# Patient Record
Sex: MALE | Race: WHITE | NOT HISPANIC OR LATINO | Employment: UNEMPLOYED | URBAN - METROPOLITAN AREA
[De-identification: names, ages, dates, MRNs, and addresses within clinical notes are randomized per-mention and may not be internally consistent; named-entity substitution may affect disease eponyms.]

---

## 2020-02-19 ENCOUNTER — CONSULT (OUTPATIENT)
Dept: SLEEP MEDICINE | Facility: HOSPITAL | Age: 50
End: 2020-02-19

## 2020-02-19 VITALS
OXYGEN SATURATION: 93 % | BODY MASS INDEX: 44.1 KG/M2 | HEART RATE: 55 BPM | WEIGHT: 315 LBS | SYSTOLIC BLOOD PRESSURE: 181 MMHG | DIASTOLIC BLOOD PRESSURE: 87 MMHG | HEIGHT: 71 IN

## 2020-02-19 DIAGNOSIS — G47.33 OBSTRUCTIVE SLEEP APNEA, ADULT: ICD-10-CM

## 2020-02-19 DIAGNOSIS — R06.83 SNORING: Primary | ICD-10-CM

## 2020-02-19 DIAGNOSIS — E66.01 MORBID OBESITY WITH BODY MASS INDEX (BMI) OF 50.0 TO 59.9 IN ADULT (HCC): ICD-10-CM

## 2020-02-19 PROCEDURE — 99203 OFFICE O/P NEW LOW 30 MIN: CPT | Performed by: INTERNAL MEDICINE

## 2020-02-19 RX ORDER — AMLODIPINE BESYLATE 5 MG/1
TABLET ORAL
COMMUNITY
Start: 2020-01-27 | End: 2020-12-07

## 2020-02-19 RX ORDER — CHLORTHALIDONE 25 MG/1
TABLET ORAL
COMMUNITY
Start: 2020-01-29 | End: 2022-10-19

## 2020-02-19 RX ORDER — METOPROLOL SUCCINATE 25 MG/1
25 TABLET, EXTENDED RELEASE ORAL DAILY
COMMUNITY
Start: 2020-02-15

## 2020-02-19 RX ORDER — BUPRENORPHINE HYDROCHLORIDE AND NALOXONE HYDROCHLORIDE DIHYDRATE 8; 2 MG/1; MG/1
1 TABLET SUBLINGUAL
COMMUNITY
Start: 2020-02-09

## 2020-02-19 RX ORDER — LISINOPRIL 40 MG/1
40 TABLET ORAL DAILY
COMMUNITY
End: 2022-07-08 | Stop reason: DRUGHIGH

## 2020-02-19 NOTE — PATIENT INSTRUCTIONS

## 2020-02-20 NOTE — PROGRESS NOTES
Subjective   Nic Carey is a 49 y.o. male is being seen for consultation today at the request of Tapan Gonzalez MD for the evaluation of snoring possible sleep disordered breathing.  His primary care physician is Dr. Marin.    History of Present Illness  Patient has been having some complaints of chest pain was seen for Cardiologic evaluation by Dr. Gonzalez.  He is referred for possible sleep disordered breathing.  The patient had snoring noted for at least 10 years.  He denies being told he had apneas.  He occasionally awakens gasping.  He says he sometimes is rested in the morning although occasionally not.  He has a headache 3 to 4 days/week on awakening.  He will fall asleep to sitting quietly during the day.  He denies problems while driving.He naps for about 45 minutes each day.    He has a history of loud snoring and snores in all positions he is awaken with a dry mouth and awaken coughing.  He has broken his nose in the past and says he did not have surgery but denies having problems breathing through his nose.  He has a history of reflux that he controls with diet.  He denies hypnagogue hallucinations sleep paralysis.  He does have a kicking of his legs at night that will occasionally keep him awake.  He has back leg and knee pain that bothers him at night.  He says his weight is fairly stable.    He goes to bed about 11 PM.  He will fall asleep in 20 minutes.  He wakens 3-5 times during the night.  He thinks he gets about 6 hours of sleep but feels tired.  He has a history of hypertension known for 4 years.  He denies any history of diabetes coronary artery disease.  Allergies   Allergen Reactions   • Ciprofloxacin Other (See Comments)     Impaired speech          Current Outpatient Medications:   •  amLODIPine (NORVASC) 5 MG tablet, , Disp: , Rfl:   •  aspirin 81 MG tablet, Take 81 mg by mouth Daily., Disp: , Rfl:   •  buprenorphine-naloxone (SUBOXONE) 8-2 MG per SL tablet, TAKE 2 TABLETS BY MOUTH  SUBLINGUALLY DAILY, Disp: , Rfl:   •  chlorthalidone (HYGROTON) 25 MG tablet, , Disp: , Rfl:   •  lisinopril (PRINIVIL,ZESTRIL) 40 MG tablet, Take 40 mg by mouth Daily., Disp: , Rfl:   •  metoprolol succinate XL (TOPROL-XL) 25 MG 24 hr tablet, , Disp: , Rfl:     Social History    Tobacco Use      Smoking status: Former Smoker he estimates smoking 2 packs/day for 35 years but quit 5 years ago.        Packs/day: 2.00        Types: Cigarettes        Quit date:         Years since quittin.1      Smokeless tobacco: Never Used       Social History     Substance and Sexual Activity   Alcohol Use Never   • Frequency: Never       Caffeine: He has 1 cup of coffee and 2 jagruti each day    Past Medical History:   Diagnosis Date   • Hypertension        Past Surgical History:   Procedure Laterality Date   • BACK SURGERY     • LAPAROSCOPIC CHOLECYSTECTOMY     He is also had oral surgery.    Family History   Problem Relation Age of Onset   • Hypertension Mother    • Sleep apnea Mother    • Diabetes Father    • Hypertension Father    • COPD Sister    • Stroke Brother    • Sleep apnea Brother    • Sleep apnea Brother    • Diabetes Brother        The following portions of the patient's history were reviewed and updated as appropriate: allergies, current medications, past family history, past medical history, past social history, past surgical history and problem list.    Review of Systems   Constitutional: Positive for unexpected weight change.   HENT: Positive for voice change.    Eyes: Positive for visual disturbance.   Respiratory: Positive for shortness of breath and wheezing.    Cardiovascular: Positive for leg swelling.   Gastrointestinal: Positive for blood in stool and constipation.        He complains of frequent heartburn   Endocrine: Positive for polydipsia.   Genitourinary: Negative.    Musculoskeletal: Positive for arthralgias, back pain, joint swelling and myalgias.   Skin: Negative.   "  Allergic/Immunologic: Negative.    Neurological: Positive for numbness and headaches.   Hematological: Negative.    Psychiatric/Behavioral: Positive for dysphoric mood. The patient is nervous/anxious.    [place] score is 14/24    Objective     BP (!) 181/87   Pulse 55   Ht 180.3 cm (71\")   Wt (!) 174 kg (383 lb 3.2 oz)   SpO2 93%   BMI 53.45 kg/m²      Physical Exam   Constitutional: He is oriented to person, place, and time. He appears well-developed and well-nourished.   He is morbidly obese.   HENT:   Head: Normocephalic and atraumatic.   He has Mallampati class II anatomy and is edentulous   Eyes: Pupils are equal, round, and reactive to light. EOM are normal.   Neck: Normal range of motion. Neck supple.   Cardiovascular: Normal rate, regular rhythm and normal heart sounds.   Pulmonary/Chest: Effort normal and breath sounds normal.   Abdominal: Soft. Bowel sounds are normal.   Musculoskeletal: Normal range of motion. He exhibits edema.   He has 3+ pedal edema   Neurological: He is alert and oriented to person, place, and time.   Skin: Skin is warm and dry.   Psychiatric: He has a normal mood and affect. His behavior is normal.         Assessment/Plan   Nic was seen today for sleeping problem.    Diagnoses and all orders for this visit:    Snoring  -     Home Sleep Study; Future    Obstructive sleep apnea, adult  -     Home Sleep Study; Future    Morbid obesity with body mass index (BMI) of 50.0 to 59.9 in adult (CMS/[facility])    Patient has a history of snoring and nonrestorative sleep.  I think he gives an excellent story for obstructive sleep apnea.  We will plan to proceed to home sleep testing.  We have discussed possible therapies including CPAP, weight control, oral appliance, and surgery.  We have also discussed the long-term consequences of untreated obstructive sleep apnea.  He is encouraged to lose weight.  He is encouraged to avoid alcohol and sedatives close to bedtime.  He is encouraged to " practice lateral position sleep.  He does have a history of occasional leg kicks.  If these do not improve with treatment of his obstructive sleep apnea they may need further evaluation.         Ren Rust MD [facility]  Sleep Medicine  Pulmonary and Critical Care Medicine

## 2020-03-03 ENCOUNTER — HOSPITAL ENCOUNTER (OUTPATIENT)
Dept: SLEEP MEDICINE | Facility: HOSPITAL | Age: 50
Discharge: HOME | End: 2020-03-03
Admitting: INTERNAL MEDICINE

## 2020-03-03 VITALS
OXYGEN SATURATION: 92 % | RESPIRATION RATE: 16 BRPM | BODY MASS INDEX: 44.1 KG/M2 | WEIGHT: 315 LBS | HEIGHT: 71 IN | DIASTOLIC BLOOD PRESSURE: 70 MMHG | SYSTOLIC BLOOD PRESSURE: 142 MMHG | HEART RATE: 71 BPM

## 2020-03-03 DIAGNOSIS — R06.83 SNORING: ICD-10-CM

## 2020-03-03 DIAGNOSIS — G47.33 OBSTRUCTIVE SLEEP APNEA, ADULT: ICD-10-CM

## 2020-03-03 PROCEDURE — 95806 SLEEP STUDY UNATT&RESP EFFT: CPT

## 2020-03-03 PROCEDURE — 95806 SLEEP STUDY UNATT&RESP EFFT: CPT | Performed by: INTERNAL MEDICINE

## 2020-03-05 DIAGNOSIS — G47.33 SEVERE OBSTRUCTIVE SLEEP APNEA: Primary | ICD-10-CM

## 2020-03-05 DIAGNOSIS — R06.83 SNORING: ICD-10-CM

## 2020-03-11 ENCOUNTER — OFFICE VISIT (OUTPATIENT)
Dept: SLEEP MEDICINE | Facility: HOSPITAL | Age: 50
End: 2020-03-11

## 2020-03-11 VITALS
WEIGHT: 315 LBS | DIASTOLIC BLOOD PRESSURE: 71 MMHG | BODY MASS INDEX: 44.1 KG/M2 | HEIGHT: 71 IN | OXYGEN SATURATION: 93 % | HEART RATE: 75 BPM | SYSTOLIC BLOOD PRESSURE: 171 MMHG

## 2020-03-11 DIAGNOSIS — E66.01 MORBID OBESITY WITH BODY MASS INDEX (BMI) OF 50.0 TO 59.9 IN ADULT (HCC): ICD-10-CM

## 2020-03-11 DIAGNOSIS — G47.33 SEVERE OBSTRUCTIVE SLEEP APNEA: Primary | ICD-10-CM

## 2020-03-11 PROCEDURE — 99214 OFFICE O/P EST MOD 30 MIN: CPT | Performed by: INTERNAL MEDICINE

## 2020-03-13 NOTE — PROGRESS NOTES
Subjective   Nic Carey is a 49 y.o. male is here today for follow-up.  He is seen for follow-up of snoring and nonrestorative sleep.  He is referred by Dr. Gonzalez.  His primary care physician is Dr. Marin.    History of Present Illness  Patient was last seen February 19.  He had a history of snoring restorative sleep.  He also with a history of hypertension morbid obesity.  He says he has been sleeping about the same.  He denies any change in health status.  He had home sleep testing on March 3 and thought his study night was slightly worse than usual.  He was irritated some by the device.  Past Medical History:   Diagnosis Date   • Hypertension        Past Surgical History:   Procedure Laterality Date   • BACK SURGERY  2005   • LAPAROSCOPIC CHOLECYSTECTOMY  2007           Current Outpatient Medications:   •  amLODIPine (NORVASC) 5 MG tablet, , Disp: , Rfl:   •  aspirin 81 MG tablet, Take 81 mg by mouth Daily., Disp: , Rfl:   •  buprenorphine-naloxone (SUBOXONE) 8-2 MG per SL tablet, TAKE 2 TABLETS BY MOUTH SUBLINGUALLY DAILY, Disp: , Rfl:   •  chlorthalidone (HYGROTON) 25 MG tablet, , Disp: , Rfl:   •  lisinopril (PRINIVIL,ZESTRIL) 40 MG tablet, Take 40 mg by mouth Daily., Disp: , Rfl:   •  metoprolol succinate XL (TOPROL-XL) 25 MG 24 hr tablet, , Disp: , Rfl:     Allergies   Allergen Reactions   • Ciprofloxacin Other (See Comments)     Impaired speech       The following portions of the patient's history were reviewed and updated as appropriate: allergies, current medications and problem list.    Review of Systems   Constitutional: Positive for unexpected weight change.   HENT: Positive for voice change.    Eyes: Positive for visual disturbance.   Respiratory: Positive for shortness of breath and wheezing.    Cardiovascular: Positive for leg swelling.   Gastrointestinal: Positive for blood in stool and constipation.   Endocrine: Positive for polydipsia.   Genitourinary: Negative.    Musculoskeletal: Positive  "for arthralgias, back pain, joint swelling and myalgias.   Skin: Negative.    Allergic/Immunologic: Negative.    Neurological: Positive for numbness.   Hematological: Negative.    Psychiatric/Behavioral: Positive for dysphoric mood. The patient is nervous/anxious.        Objective     /71   Pulse 75   Ht 180.3 cm (71\")   Wt (!) 176 kg (387 lb 3.2 oz)   SpO2 93%   BMI 54.00 kg/m²     Physical Exam   Constitutional: He is oriented to person, place, and time. He appears well-developed and well-nourished.   He is morbidly obese.   HENT:   Head: Normocephalic and atraumatic.   He has nasal airway narrowing and nasal septal deviation.  He has Mallampati class II anatomy.   Eyes: Pupils are equal, round, and reactive to light. EOM are normal.   Neck: Normal range of motion. Neck supple.   Cardiovascular: Normal rate, regular rhythm and normal heart sounds.   Pulmonary/Chest: Effort normal and breath sounds normal.   Abdominal: Soft. Bowel sounds are normal.   Musculoskeletal: Normal range of motion. He exhibits edema.   He has 2+ pedal edema   Neurological: He is alert and oriented to person, place, and time.   Skin: Skin is warm and dry.   Psychiatric: He has a normal mood and affect. His behavior is normal.   Home sleep testing on March 3 showed AHI.  This is consistent with severe obstructive sleep apnea.  He spent 199.7 minutes in the desaturated state or 45 minutes time in bed.      Assessment/Plan   Nic was seen today for follow-up.    Diagnoses and all orders for this visit:    Severe obstructive sleep apnea    Morbid obesity with body mass index (BMI) of 50.0 to 59.9 in adult (CMS/[facility])    Patient does have severe obstructive sleep apnea.  We discussed possible therapies including CPAP, weight control, oral appliance, and surgery.  He wishes to try CPAP.  We will place the order this.  We will plan to see him back in 2 months and we will download his machine to make certain ineffective.  If he will " tolerate the CPAP he will need overnight oximetry to be certain we are correcting nocturnal hypoxemia.  He is encouraged to lose weight.  He declines referral to the weight loss clinic.  He  Is encouraged avoid alcohol and sedatives close to bedtime.  He is encouraged ice lateral position sleep.           Ren Rust MD [facility]  Sleep Medicine  Pulmonary and Critical Care Medicine      03/13/20  11:49 AM

## 2020-05-19 ENCOUNTER — TELEMEDICINE (OUTPATIENT)
Dept: SLEEP MEDICINE | Facility: HOSPITAL | Age: 50
End: 2020-05-19

## 2020-05-19 VITALS — BODY MASS INDEX: 44.1 KG/M2 | HEIGHT: 71 IN | WEIGHT: 315 LBS

## 2020-05-19 DIAGNOSIS — G47.34 NOCTURNAL HYPOXEMIA: ICD-10-CM

## 2020-05-19 DIAGNOSIS — G47.33 OSA (OBSTRUCTIVE SLEEP APNEA): Primary | ICD-10-CM

## 2020-05-19 PROCEDURE — 99442 PR PHYS/QHP TELEPHONE EVALUATION 11-20 MIN: CPT | Performed by: NURSE PRACTITIONER

## 2020-05-19 NOTE — PROGRESS NOTES
Chief Complaint:   Chief Complaint   Patient presents with   • Follow-up       HPI:    Nic Carey is a 49 y.o. male here for follow-up of sleep apnea.  Patient was last seen 3/11/2020 and did decide to initiate CPAP therapy after sleep study 3/3/2020 showed severe obstructive sleep apnea as well as nocturnal hypoxemia.  Patient does not feel he is doing well with CPAP therapy.  Patient is sleeping 5 to 6 hours nightly and not refreshed upon awakening.  It does take patient around 30 minutes to go to sleep and he does get up in the night to use the restroom.  Patient has an [place] score of 13/24.  Patient states when the pressure increases on the mask this is when he becomes uncomfortable and takes the mask off.  I discussed with him today about his elevated AHI and that we will increase the pressure to start out at 12.  If patient does not do well with this we may have to switch him to BiPAP and patient is okay with this plan of care.        Current medications are:   Current Outpatient Medications:   •  amLODIPine (NORVASC) 5 MG tablet, , Disp: , Rfl:   •  aspirin 81 MG tablet, Take 81 mg by mouth Daily., Disp: , Rfl:   •  buprenorphine-naloxone (SUBOXONE) 8-2 MG per SL tablet, TAKE 2 TABLETS BY MOUTH SUBLINGUALLY DAILY, Disp: , Rfl:   •  chlorthalidone (HYGROTON) 25 MG tablet, , Disp: , Rfl:   •  lisinopril (PRINIVIL,ZESTRIL) 40 MG tablet, Take 40 mg by mouth Daily., Disp: , Rfl:   •  metoprolol succinate XL (TOPROL-XL) 25 MG 24 hr tablet, , Disp: , Rfl: .      The patient's relevant past medical, surgical, family and social history were reviewed and updated in Epic as appropriate.       Review of Systems   HENT: Positive for voice change.    Eyes: Positive for visual disturbance.   Respiratory: Positive for apnea, shortness of breath and wheezing.    Cardiovascular: Positive for leg swelling.   Gastrointestinal: Positive for blood in stool and constipation.   Endocrine: Positive for polydipsia.    Musculoskeletal: Positive for arthralgias, back pain, joint swelling and myalgias.   Neurological: Positive for numbness.   Psychiatric/Behavioral: Positive for dysphoric mood and sleep disturbance. The patient is nervous/anxious.    All other systems reviewed and are negative.        Objective:    Physical Exam   Constitutional: He is oriented to person, place, and time. He appears well-developed and well-nourished.   Morbidly obese male   HENT:   Head: Normocephalic and atraumatic.   Mallampati 2 anatomy   Neck: No tracheal deviation present.   Pulmonary/Chest: Effort normal.   Neurological: He is alert and oriented to person, place, and time.   Psychiatric: He has a normal mood and affect. His behavior is normal. Judgment and thought content normal.   41/62 days of use.  Greater than 4-hour use 38.7%.  90% pressure 12.0.  AHI of 27.6.  Settings 8-18.      ASSESSMENT/PLAN    Nic was seen today for follow-up.    Diagnoses and all orders for this visit:    EDGARD (obstructive sleep apnea)  -     PAP Therapy    Nocturnal hypoxemia            1. Counseled patient regarding multimodal approach with healthy nutrition, healthy sleep, regular physical activity, social activities, counseling, and medications. Encouraged to practice lateral sleep position. Avoid alcohol and sedatives close to bedtime.  2.   Refill supplies, settings have been adjusted to 12 to 20 cm H2O I will see patient back in 5 weeks to reassess possible switch to BiPAP next appointment if AHI is uncontrolled and patient cannot tolerate higher settings.  When AHI is under control we will order overnight oximetry while wearing CPAP to reassess nocturnal hypoxemia.  Unable to complete visit using a video connection to the patient. A phone visit was used to complete this visits. Total time of discussion was 15 minutes.    I have reviewed the results of my evaluation and impression and discussed my recommendations in detail with the patient.      Signed  by  Roseline Chester, APRN    May 19, 2020      CC: Gab Marin MD          No ref. provider found

## 2020-07-28 ENCOUNTER — OFFICE VISIT (OUTPATIENT)
Dept: SLEEP MEDICINE | Facility: HOSPITAL | Age: 50
End: 2020-07-28

## 2020-07-28 VITALS
SYSTOLIC BLOOD PRESSURE: 121 MMHG | HEIGHT: 71 IN | HEART RATE: 53 BPM | BODY MASS INDEX: 44.1 KG/M2 | WEIGHT: 315 LBS | DIASTOLIC BLOOD PRESSURE: 64 MMHG | OXYGEN SATURATION: 93 %

## 2020-07-28 DIAGNOSIS — G47.34 NOCTURNAL HYPOXEMIA: ICD-10-CM

## 2020-07-28 DIAGNOSIS — G47.33 OSA (OBSTRUCTIVE SLEEP APNEA): Primary | ICD-10-CM

## 2020-07-28 PROCEDURE — 99213 OFFICE O/P EST LOW 20 MIN: CPT | Performed by: NURSE PRACTITIONER

## 2020-07-28 RX ORDER — DOXAZOSIN 2 MG/1
2 TABLET ORAL NIGHTLY
COMMUNITY
End: 2022-07-08 | Stop reason: DRUGHIGH

## 2020-07-28 NOTE — PROGRESS NOTES
Chief Complaint:   Chief Complaint   Patient presents with   • Follow-up       HPI:    Nic Carey is a 49 y.o. male here for follow-up of sleep apnea.  Patient was last seen 5/19/2020 was not doing well with CPAP therapy.  Patient had an increased AHI at that time 27.6.  Looking at his 90% pressures we did increase his settings to 12-20.  We did spend some on last visit discussing BiPAP therapy and her need to possibly switch to this if we cannot get him under control.  Patient does feel at times that CPAP goes too high and he feels he is smothering and will cough his mask quickly.  Patient does want to feel better and at this time has an [place] scale of 6/20.  Patient is sleeping 6 to 7 hours night and does feel rested intermittently.  Patient will go to sleep within 15 to 30 minutes.    On 3/3/2020 testing also showed nocturnal hypoxemia, however, patient has not been able to be tested as of yet as his AHI has not been normal.  We will move forward passes soon as we have them stable.        Current medications are:   Current Outpatient Medications:   •  aspirin 81 MG tablet, Take 81 mg by mouth Daily., Disp: , Rfl:   •  buprenorphine-naloxone (SUBOXONE) 8-2 MG per SL tablet, TAKE 2 TABLETS BY MOUTH SUBLINGUALLY DAILY, Disp: , Rfl:   •  chlorthalidone (HYGROTON) 25 MG tablet, , Disp: , Rfl:   •  doxazosin (CARDURA) 2 MG tablet, Take 2 mg by mouth Every Night., Disp: , Rfl:   •  lisinopril (PRINIVIL,ZESTRIL) 40 MG tablet, Take 40 mg by mouth Daily., Disp: , Rfl:   •  metoprolol succinate XL (TOPROL-XL) 25 MG 24 hr tablet, , Disp: , Rfl:   •  amLODIPine (NORVASC) 5 MG tablet, , Disp: , Rfl: .      The patient's relevant past medical, surgical, family and social history were reviewed and updated in Epic as appropriate.       Review of Systems   HENT: Positive for voice change.    Eyes: Positive for visual disturbance.   Respiratory: Positive for apnea, shortness of breath and wheezing.    Cardiovascular:  Positive for leg swelling.   Gastrointestinal: Positive for constipation.   Endocrine: Positive for polydipsia.   Musculoskeletal: Positive for arthralgias, back pain, joint swelling and myalgias.   Neurological: Positive for numbness.   Psychiatric/Behavioral: Positive for dysphoric mood and sleep disturbance. The patient is nervous/anxious.    All other systems reviewed and are negative.        Objective:    Physical Exam   Constitutional: He is oriented to person, place, and time. He appears well-developed and well-nourished.   HENT:   Head: Normocephalic and atraumatic.   Mouth/Throat: Oropharynx is clear and moist.   Eyes: Conjunctivae are normal.   Neck: No tracheal deviation present.   Cardiovascular: Normal rate.   Pulmonary/Chest: Breath sounds normal.   Musculoskeletal: Normal range of motion.   Neurological: He is alert and oriented to person, place, and time.   Skin: Skin is warm and dry. No erythema. No pallor.   Psychiatric: He has a normal mood and affect. His behavior is normal. Judgment and thought content normal.   27/32 days.  Greater than 4-hour use 78.190% pressure 6 84-22.9 current settings 12 to 20 cm H2O.      ASSESSMENT/PLAN    Nic was seen today for follow-up.    Diagnoses and all orders for this visit:    EDGARD (obstructive sleep apnea)  -     CPAP Therapy    Nocturnal hypoxemia            1. Counseled patient regarding multimodal approach with healthy nutrition, healthy sleep, regular physical activity, social activities, counseling, and medications. Encouraged to practice lateral sleep position. Avoid alcohol and sedatives close to bedtime.  2.   Patient has inability to tolerate CPAP we will be switching to BiPAP maximum IPAP 25 minimum EPAP maximum pressure support 8 minimum pressure support 4.  I will see patient back in 5 weeks to reassess.  Patient's AHI is controlled on next appointment we will have an overnight oximetry done to reassess his hypoxemia.  I have reviewed the  results of my evaluation and impression and discussed my recommendations in detail with the patient.      Signed by  Roseline Chester, APRN    July 28, 2020      CC: Gab Marin MD          No ref. provider found

## 2020-12-07 ENCOUNTER — TELEMEDICINE (OUTPATIENT)
Dept: SLEEP MEDICINE | Facility: HOSPITAL | Age: 50
End: 2020-12-07

## 2020-12-07 DIAGNOSIS — G47.33 OSA (OBSTRUCTIVE SLEEP APNEA): Primary | ICD-10-CM

## 2020-12-07 PROCEDURE — 99213 OFFICE O/P EST LOW 20 MIN: CPT | Performed by: NURSE PRACTITIONER

## 2020-12-07 NOTE — PROGRESS NOTES
Chief Complaint:   Chief Complaint   Patient presents with   • Follow-up       HPI:    Nic Carey is a 50 y.o. male here for follow-up of sleep apnea.  Patient was last seen 7/28/2020.  Patient does have sleep apnea and nocturnal hypoxemia.  Patient was unable to tolerate CPAP and on last visit he did wish to switch to BiPAP.  Patient feels he is doing better on BiPAP therapy however we have discussed today his AHI is still elevated at 31.3.  We will be making some changes today to try and decrease the AHI to 5 or less.  Patient states he will do fine with this.  He is sleeping 6 to 7 hours nightly and does feel rested upon awakening.  Patient will get up x1 in the night to use the bathroom.  Patient will go to sleep within 15 to 20 minutes.  Patient does understand we are increasing pressures and that he will follow-up.        Current medications are:   Current Outpatient Medications:   •  buprenorphine-naloxone (SUBOXONE) 8-2 MG per SL tablet, TAKE 2 TABLETS BY MOUTH SUBLINGUALLY DAILY, Disp: , Rfl:   •  chlorthalidone (HYGROTON) 25 MG tablet, , Disp: , Rfl:   •  doxazosin (CARDURA) 2 MG tablet, Take 2 mg by mouth Every Night., Disp: , Rfl:   •  lisinopril (PRINIVIL,ZESTRIL) 40 MG tablet, Take 40 mg by mouth Daily., Disp: , Rfl:   •  metoprolol succinate XL (TOPROL-XL) 25 MG 24 hr tablet, , Disp: , Rfl:   •  aspirin 81 MG tablet, Take 81 mg by mouth Daily., Disp: , Rfl: .      The patient's relevant past medical, surgical, family and social history were reviewed and updated in Epic as appropriate.       Review of Systems   HENT: Positive for voice change.    Eyes: Positive for visual disturbance.   Respiratory: Positive for apnea, shortness of breath and wheezing.    Cardiovascular: Positive for leg swelling.   Gastrointestinal: Positive for constipation.   Endocrine: Positive for polydipsia.   Musculoskeletal: Positive for arthralgias, back pain and myalgias.   Neurological: Positive for numbness.    Psychiatric/Behavioral: Positive for dysphoric mood and sleep disturbance. The patient is nervous/anxious.          Objective:    Physical Exam  Pulmonary:      Effort: Pulmonary effort is normal. No respiratory distress.   Neurological:      Mental Status: He is alert.   Psychiatric:         Mood and Affect: Mood normal.         Behavior: Behavior normal.         Thought Content: Thought content normal.         Judgment: Judgment normal.       24/30 days of use  Greater than 4-hour use 63.3  90% IPAP 15.5  90% EPAP 11  AHI 31.3    ASSESSMENT/PLAN    Diagnoses and all orders for this visit:    1. EDGARD (obstructive sleep apnea) (Primary)  -     Generic Auto PAP Order            1. Counseled patient regarding multimodal approach with healthy nutrition, healthy sleep, regular physical activity, social activities, counseling, and medications. Encouraged to practice lateral sleep position. Avoid alcohol and sedatives close to bedtime.  2.   Unable to complete visit using a video connection to the patient. A phone visit was used to complete this visits. Total time of discussion was 15 minutes.  Increase minimum EPAP to 12 cm back in 4 weeks.  I have reviewed the results of my evaluation and impression and discussed my recommendations in detail with the patient.      Signed by  ANISA Hoff    December 7, 2020      CC: Gab Marin MD          No ref. provider found

## 2022-05-12 ENCOUNTER — LAB (OUTPATIENT)
Dept: LAB | Facility: HOSPITAL | Age: 52
End: 2022-05-12

## 2022-05-12 ENCOUNTER — TELEPHONE (OUTPATIENT)
Dept: NEUROLOGY | Facility: CLINIC | Age: 52
End: 2022-05-12

## 2022-05-12 ENCOUNTER — OFFICE VISIT (OUTPATIENT)
Dept: NEUROLOGY | Facility: CLINIC | Age: 52
End: 2022-05-12

## 2022-05-12 VITALS
DIASTOLIC BLOOD PRESSURE: 66 MMHG | HEART RATE: 65 BPM | HEIGHT: 70 IN | WEIGHT: 315 LBS | OXYGEN SATURATION: 96 % | TEMPERATURE: 97.3 F | SYSTOLIC BLOOD PRESSURE: 136 MMHG | BODY MASS INDEX: 45.1 KG/M2

## 2022-05-12 DIAGNOSIS — M79.10 MYALGIA: ICD-10-CM

## 2022-05-12 DIAGNOSIS — E66.01 MORBID OBESITY WITH BODY MASS INDEX (BMI) OF 50.0 TO 59.9 IN ADULT: ICD-10-CM

## 2022-05-12 DIAGNOSIS — G60.9 IDIOPATHIC PERIPHERAL NEUROPATHY: Primary | ICD-10-CM

## 2022-05-12 DIAGNOSIS — G47.33 SEVERE OBSTRUCTIVE SLEEP APNEA: ICD-10-CM

## 2022-05-12 DIAGNOSIS — R20.2 PARESTHESIA OF BOTH HANDS: ICD-10-CM

## 2022-05-12 PROBLEM — I10 HYPERTENSION: Status: ACTIVE | Noted: 2022-05-12

## 2022-05-12 PROBLEM — M54.50 CHRONIC LOW BACK PAIN: Status: ACTIVE | Noted: 2022-05-12

## 2022-05-12 PROBLEM — G89.29 CHRONIC LOW BACK PAIN: Status: ACTIVE | Noted: 2022-05-12

## 2022-05-12 PROBLEM — E03.9 ACQUIRED HYPOTHYROIDISM: Status: ACTIVE | Noted: 2022-05-12

## 2022-05-12 PROBLEM — F41.9 ANXIETY: Status: ACTIVE | Noted: 2022-05-12

## 2022-05-12 PROBLEM — R73.03 PRE-DIABETES: Status: ACTIVE | Noted: 2022-05-12

## 2022-05-12 PROBLEM — N18.2 STAGE 2 CHRONIC KIDNEY DISEASE: Status: ACTIVE | Noted: 2022-05-12

## 2022-05-12 PROBLEM — F79 MENTALLY DISABLED: Status: ACTIVE | Noted: 2022-05-12

## 2022-05-12 LAB
ALBUMIN SERPL-MCNC: 3.8 G/DL (ref 3.5–5.2)
ALBUMIN/GLOB SERPL: 1.1 G/DL
ALP SERPL-CCNC: 64 U/L (ref 39–117)
ALT SERPL W P-5'-P-CCNC: 18 U/L (ref 1–41)
ANION GAP SERPL CALCULATED.3IONS-SCNC: 12.1 MMOL/L (ref 5–15)
AST SERPL-CCNC: 24 U/L (ref 1–40)
BASOPHILS # BLD AUTO: 0.03 10*3/MM3 (ref 0–0.2)
BASOPHILS NFR BLD AUTO: 0.7 % (ref 0–1.5)
BILIRUB SERPL-MCNC: 0.5 MG/DL (ref 0–1.2)
BUN SERPL-MCNC: 21 MG/DL (ref 6–20)
BUN/CREAT SERPL: 24.1 (ref 7–25)
CALCIUM SPEC-SCNC: 9.7 MG/DL (ref 8.6–10.5)
CHLORIDE SERPL-SCNC: 96 MMOL/L (ref 98–107)
CHROMATIN AB SERPL-ACNC: 11.2 IU/ML (ref 0–14)
CK SERPL-CCNC: 236 U/L (ref 20–200)
CO2 SERPL-SCNC: 28.9 MMOL/L (ref 22–29)
CREAT SERPL-MCNC: 0.87 MG/DL (ref 0.76–1.27)
CRP SERPL-MCNC: 4.06 MG/DL (ref 0–0.5)
DEPRECATED RDW RBC AUTO: 41.7 FL (ref 37–54)
EGFRCR SERPLBLD CKD-EPI 2021: 103.8 ML/MIN/1.73
EOSINOPHIL # BLD AUTO: 0.16 10*3/MM3 (ref 0–0.4)
EOSINOPHIL NFR BLD AUTO: 3.9 % (ref 0.3–6.2)
ERYTHROCYTE [DISTWIDTH] IN BLOOD BY AUTOMATED COUNT: 13 % (ref 12.3–15.4)
FOLATE SERPL-MCNC: 8.32 NG/ML (ref 4.78–24.2)
GLOBULIN UR ELPH-MCNC: 3.4 GM/DL
GLUCOSE SERPL-MCNC: 117 MG/DL (ref 65–99)
HBA1C MFR BLD: 5.5 % (ref 4.8–5.6)
HCT VFR BLD AUTO: 34.3 % (ref 37.5–51)
HGB BLD-MCNC: 11.6 G/DL (ref 13–17.7)
IMM GRANULOCYTES # BLD AUTO: 0.01 10*3/MM3 (ref 0–0.05)
IMM GRANULOCYTES NFR BLD AUTO: 0.2 % (ref 0–0.5)
LYMPHOCYTES # BLD AUTO: 0.97 10*3/MM3 (ref 0.7–3.1)
LYMPHOCYTES NFR BLD AUTO: 23.4 % (ref 19.6–45.3)
MCH RBC QN AUTO: 30.1 PG (ref 26.6–33)
MCHC RBC AUTO-ENTMCNC: 33.8 G/DL (ref 31.5–35.7)
MCV RBC AUTO: 88.9 FL (ref 79–97)
MONOCYTES # BLD AUTO: 0.79 10*3/MM3 (ref 0.1–0.9)
MONOCYTES NFR BLD AUTO: 19 % (ref 5–12)
NEUTROPHILS NFR BLD AUTO: 2.19 10*3/MM3 (ref 1.7–7)
NEUTROPHILS NFR BLD AUTO: 52.8 % (ref 42.7–76)
NRBC BLD AUTO-RTO: 0 /100 WBC (ref 0–0.2)
PLATELET # BLD AUTO: 181 10*3/MM3 (ref 140–450)
PMV BLD AUTO: 10.4 FL (ref 6–12)
POTASSIUM SERPL-SCNC: 4.1 MMOL/L (ref 3.5–5.2)
PROT SERPL-MCNC: 7.2 G/DL (ref 6–8.5)
RBC # BLD AUTO: 3.86 10*6/MM3 (ref 4.14–5.8)
SODIUM SERPL-SCNC: 137 MMOL/L (ref 136–145)
TSH SERPL DL<=0.05 MIU/L-ACNC: 2.5 UIU/ML (ref 0.27–4.2)
VIT B12 BLD-MCNC: 322 PG/ML (ref 211–946)
WBC NRBC COR # BLD: 4.15 10*3/MM3 (ref 3.4–10.8)

## 2022-05-12 PROCEDURE — 82607 VITAMIN B-12: CPT

## 2022-05-12 PROCEDURE — 86431 RHEUMATOID FACTOR QUANT: CPT

## 2022-05-12 PROCEDURE — 83036 HEMOGLOBIN GLYCOSYLATED A1C: CPT

## 2022-05-12 PROCEDURE — 83825 ASSAY OF MERCURY: CPT

## 2022-05-12 PROCEDURE — 83655 ASSAY OF LEAD: CPT

## 2022-05-12 PROCEDURE — 86038 ANTINUCLEAR ANTIBODIES: CPT

## 2022-05-12 PROCEDURE — 82550 ASSAY OF CK (CPK): CPT

## 2022-05-12 PROCEDURE — 82300 ASSAY OF CADMIUM: CPT

## 2022-05-12 PROCEDURE — 82570 ASSAY OF URINE CREATININE: CPT

## 2022-05-12 PROCEDURE — 86617 LYME DISEASE ANTIBODY: CPT

## 2022-05-12 PROCEDURE — 82746 ASSAY OF FOLIC ACID SERUM: CPT

## 2022-05-12 PROCEDURE — 36415 COLL VENOUS BLD VENIPUNCTURE: CPT

## 2022-05-12 PROCEDURE — 84155 ASSAY OF PROTEIN SERUM: CPT

## 2022-05-12 PROCEDURE — 86200 CCP ANTIBODY: CPT

## 2022-05-12 PROCEDURE — 82784 ASSAY IGA/IGD/IGG/IGM EACH: CPT

## 2022-05-12 PROCEDURE — 82595 ASSAY OF CRYOGLOBULIN: CPT

## 2022-05-12 PROCEDURE — 82175 ASSAY OF ARSENIC: CPT

## 2022-05-12 PROCEDURE — 86334 IMMUNOFIX E-PHORESIS SERUM: CPT

## 2022-05-12 PROCEDURE — 84165 PROTEIN E-PHORESIS SERUM: CPT

## 2022-05-12 PROCEDURE — 86140 C-REACTIVE PROTEIN: CPT

## 2022-05-12 PROCEDURE — 99214 OFFICE O/P EST MOD 30 MIN: CPT | Performed by: NURSE PRACTITIONER

## 2022-05-12 PROCEDURE — 80050 GENERAL HEALTH PANEL: CPT

## 2022-05-12 RX ORDER — SERTRALINE HYDROCHLORIDE 100 MG/1
100 TABLET, FILM COATED ORAL DAILY
COMMUNITY
Start: 2022-05-03

## 2022-05-12 RX ORDER — LEVOTHYROXINE SODIUM 0.03 MG/1
25 TABLET ORAL DAILY
COMMUNITY
Start: 2022-03-02

## 2022-05-12 RX ORDER — GABAPENTIN 300 MG/1
300 CAPSULE ORAL 3 TIMES DAILY
COMMUNITY
Start: 2022-04-06

## 2022-05-12 NOTE — TELEPHONE ENCOUNTER
I ATTEMPTED TO CONTACT [employer] AND TRIED MULTIPLE EXTENSIONS. UNABLE TO REACH ANYONE DIRECTLY OR LVM.    DOCUMENTING PER [employer].

## 2022-05-12 NOTE — TELEPHONE ENCOUNTER
Caller: Nic Carey    Relationship: Self    Best call back number: [phone]    What was the call regarding: PT CALLED STATING HE CONTACTED [employer] TO REQUEST EMG/NCV RESULTS BE FAXED TO OUR OFFICE. PT STATES THAT [employer] REQUESTED THAT OUR OFFICE CONTACT THEM DIRECTLY TO REQUEST EMG/NCV.    Do you require a callback: IF NEEDED.    DOCUMENTING PER [employer].

## 2022-05-12 NOTE — TELEPHONE ENCOUNTER
Called patient and spoke with him about where to get his records.  Faxed a request for EMG/NCV results.

## 2022-05-12 NOTE — PROGRESS NOTES
Neuro Office Visit      Encounter Date: 2022   Patient Name: Nic Carey  : 1970   MRN: [id number]   PCP: Dr Marin  Referring: Tolu Barrios  Chief Complaint:    Chief Complaint   Patient presents with   • Peripheral Neuropathy       History of Present Illness: Nic Carey is a 51 y.o. male who is here today in Neurology for neuropathy    Neuropathy  1 year ago developed pain in bilat feet, pins and needles. Sx up to ankle. Sx are intermittent. Sleeps with his shoes on. Treated with GBP which helped the pain but not the numbness.  He thinks it is from his back. It is positional. No falls.  EMG of bilat lower ext -showed neuropathy.(Data deficient)    2 months developed numbness, tingling and burning of bilat hands. Left >R. Ring finger and pinky are more involved. Has not had EMG of UE.Skin feels dry. No weakness or dropping items.  No neck pain.    Complains of myalgia if his sits with feet dangling.    Taking  tid reduces pain.    Has pre-diabetes. Last A1c around 7.    No etoh. Not skipping meals.    Has sleep apnea. On bipap. Non-compliant. Needs follow up.      Taking Subsolv    PMH: history of opiate and meth abuse. No etoh, former smoker, chronic low back pain, myalgia, anxiety, htn, hypothyroidism  SH: discectomy ellyn  Subjective      Past Medical History:   Past Medical History:   Diagnosis Date   • Acquired hypothyroidism    • Anxiety    • Chronic low back pain    • Hypertension    • Mentally disabled    • Pre-diabetes    • Stage 2 chronic kidney disease        Past Surgical History:   Past Surgical History:   Procedure Laterality Date   • BACK SURGERY     • LAPAROSCOPIC CHOLECYSTECTOMY         Family History:   Family History   Problem Relation Age of Onset   • Hypertension Mother    • Sleep apnea Mother    • Diabetes Father    • Hypertension Father    • COPD Sister    • Stroke Brother    • Sleep apnea Brother    • Sleep apnea Brother    • Diabetes Brother         Social History:   Social History     Socioeconomic History   • Marital status:    Tobacco Use   • Smoking status: Former Smoker     Packs/day: 2.00     Types: Cigarettes     Quit date:      Years since quittin.3   • Smokeless tobacco: Never Used   Substance and Sexual Activity   • Alcohol use: Never   • Drug use: Not Currently     Types: Marijuana     Comment: pain pills quit 2 yrs ago       Medications:     Current Outpatient Medications:   •  aspirin 81 MG tablet, Take 81 mg by mouth Daily., Disp: , Rfl:   •  buprenorphine-naloxone (SUBOXONE) 8-2 MG per SL tablet, TAKE 2 TABLETS BY MOUTH SUBLINGUALLY DAILY, Disp: , Rfl:   •  chlorthalidone (HYGROTON) 25 MG tablet, , Disp: , Rfl:   •  doxazosin (CARDURA) 2 MG tablet, Take 2 mg by mouth Every Night., Disp: , Rfl:   •  lisinopril (PRINIVIL,ZESTRIL) 40 MG tablet, Take 40 mg by mouth Daily., Disp: , Rfl:   •  metoprolol succinate XL (TOPROL-XL) 25 MG 24 hr tablet, , Disp: , Rfl:   •  gabapentin (NEURONTIN) 300 MG capsule, Take 300 mg by mouth 3 (Three) Times a Day., Disp: , Rfl:   •  levothyroxine (SYNTHROID, LEVOTHROID) 25 MCG tablet, Take 25 mcg by mouth Daily., Disp: , Rfl:   •  ProAir  (90 Base) MCG/ACT inhaler, 2 puffs Every 4 (Four) Hours As Needed., Disp: , Rfl:   •  sertraline (ZOLOFT) 100 MG tablet, , Disp: , Rfl:     Allergies:   Allergies   Allergen Reactions   • Ciprofloxacin Other (See Comments)     Impaired speech       PHQ-9 Total Score:     STEADI Fall Risk Assessment has not been completed.    Objective     Physical Exam:   Physical Exam  Neurological:      Mental Status: He is oriented to person, place, and time.      Coordination: Finger-Nose-Finger Test, Heel to Shin Test and Romberg Test normal.      Gait: Gait is intact.      Deep Tendon Reflexes:      Reflex Scores:       Tricep reflexes are 1+ on the right side and 1+ on the left side.       Bicep reflexes are 1+ on the right side and 1+ on the left side.        Brachioradialis reflexes are 1+ on the right side and 1+ on the left side.       Patellar reflexes are 1+ on the right side and 1+ on the left side.       Achilles reflexes are 1+ on the right side and 1+ on the left side.  Psychiatric:         Speech: Speech normal.         Neurologic Exam     Mental Status   Oriented to person, place, and time.   Follows 3 step commands.   Attention: normal. Concentration: normal.   Speech: speech is normal   Level of consciousness: alert  Knowledge: consistent with education.   Normal comprehension.     Cranial Nerves     CN III, IV, VI   Right pupil: Accommodation: intact.   Left pupil: Accommodation: intact.   CN III: no CN III palsy  CN VI: no CN VI palsy  Nystagmus: none   Diplopia: none  Upgaze: normal  Downgaze: normal  Conjugate gaze: present    CN VII   Facial expression full, symmetric.     CN VIII   Hearing: intact    Motor Exam   Muscle bulk: normal  Overall muscle tone: normal    Strength   Right neck flexion: 5/5  Left neck flexion: 5/5  Right neck extension: 5/5  Left neck extension: 5/5  Right deltoid: 5/5  Left deltoid: 5/5  Right biceps: 5/5  Left biceps: 5/5  Right triceps: 5/5  Left triceps: 5/5  Right wrist flexion: 5/5  Left wrist flexion: 5/5  Right wrist extension: 5/5  Left wrist extension: 5/5  Right interossei: 5/5  Left interossei: 5/5  Right abdominals: 5/5  Left abdominals: 5/5  Right iliopsoas: 5/5  Left iliopsoas: 5/5  Right quadriceps: 5/5  Left quadriceps: 5/5  Right hamstrin/5  Left hamstrin/5  Right glutei: 5/5  Left glutei: 5/5  Right anterior tibial: 5/5  Left anterior tibial: 5/5  Right posterior tibial: 5/5  Left posterior tibial: 5/5  Right peroneal: 5/5  Left peroneal: 5/5  Right gastroc: 5/5  Left gastroc: 5/5    Sensory Exam   Right arm light touch: decreased from elbow  Left arm light touch: normal  Right leg light touch: decreased from toes  Left leg light touch: decreased from toes    Gait, Coordination, and Reflexes  "    Gait  Gait: normal    Coordination   Romberg: negative  Finger to nose coordination: normal  Heel to shin coordination: normal    Tremor   Resting tremor: absent  Action tremor: absent    Reflexes   Right brachioradialis: 1+  Left brachioradialis: 1+  Right biceps: 1+  Left biceps: 1+  Right triceps: 1+  Left triceps: 1+  Right patellar: 1+  Left patellar: 1+  Right achilles: 1+  Left achilles: 1+  Right : 1+  Left : 1+Slow, steady with ext rotation of ankles.        Vital Signs:   Vitals:    05/12/22 0916   BP: 136/66   Pulse: 65   Temp: 97.3 °F (36.3 °C)   SpO2: 96%   Weight: (!) 161 kg (356 lb)   Height: 177.8 cm (70\")     Body mass index is 51.08 kg/m².         Assessment / Plan      Assessment/Plan:   Diagnoses and all orders for this visit:    1. Idiopathic peripheral neuropathy (Primary)  Comments:  Cont [facility]  Orders:  -     EMG & Nerve Conduction Test; Future    2. Paresthesia of both hands  -     CBC & Differential; Future  -     CK; Future  -     Comprehensive Metabolic Panel; Future  -     C-reactive Protein; Future  -     Cryoglobulin; Future  -     Hemoglobin A1c; Future  -     JOYCE + PE; Future  -     Lyme Disease, Line Blot; Future  -     Heavy Metals Profile II, Urine - Urine, Clean Catch; Future  -     Rheumatoid Arthritis (RA) Profile; Future  -     TSH; Future  -     Vitamin B12 & Folate; Future  -     YOLANDA by IFA, Reflex 9-biomarkers profile; Future  -     Rheumatoid Factor; Future  -     EMG & Nerve Conduction Test; Future    3. Myalgia    4. Severe obstructive sleep apnea  Comments:   sleep med    5. Morbid obesity with body mass index (BMI) of 50.0 to 59.9 in adult (HCC)     Encourage weight loss      Patient Education:       Reviewed medications, potential side effects and signs and symptoms to report. Discussed risk versus benefits of treatment plan with patient and/or family-including medications, labs and radiology that may be ordered. Addressed questions and concerns during " visit. Patient and/or family verbalized understanding and agree with plan. Instructed to call the office with any questions and report to ER with any life-threatening symptoms.     Follow Up:   Return in about 6 weeks (around 6/23/2022) for Recheck.    During this visit the following were done:  Labs Reviewed []    Labs Ordered []    Radiology Reports Reviewed []    Radiology Ordered []    PCP Records Reviewed [x]    Referring Provider Records Reviewed []    ER Records Reviewed []    Hospital Records Reviewed []    History Obtained From Family []    Radiology Images Reviewed []    Other Reviewed []    Records Requested []      Narciso Yadav, DNP, APRN

## 2022-05-13 LAB
ALBUMIN SERPL ELPH-MCNC: 3.6 G/DL (ref 2.9–4.4)
ALBUMIN/GLOB SERPL: 1.2 {RATIO} (ref 0.7–1.7)
ALPHA1 GLOB SERPL ELPH-MCNC: 0.2 G/DL (ref 0–0.4)
ALPHA2 GLOB SERPL ELPH-MCNC: 0.6 G/DL (ref 0.4–1)
B-GLOBULIN SERPL ELPH-MCNC: 1 G/DL (ref 0.7–1.3)
GAMMA GLOB SERPL ELPH-MCNC: 1.3 G/DL (ref 0.4–1.8)
GLOBULIN SER-MCNC: 3.2 G/DL (ref 2.2–3.9)
IGA SERPL-MCNC: 352 MG/DL (ref 90–386)
IGG SERPL-MCNC: 1319 MG/DL (ref 603–1613)
IGM SERPL-MCNC: 138 MG/DL (ref 20–172)
INTERPRETATION SERPL IEP-IMP: NORMAL
LABORATORY COMMENT REPORT: NORMAL
M PROTEIN SERPL ELPH-MCNC: NORMAL G/DL
PROT SERPL-MCNC: 6.8 G/DL (ref 6–8.5)

## 2022-05-14 LAB
ANA SER QL IF: NEGATIVE
CCP IGA+IGG SERPL IA-ACNC: 4 UNITS (ref 0–19)
LABORATORY COMMENT REPORT: NORMAL
RHEUMATOID FACT SERPL-ACNC: 11.8 IU/ML

## 2022-05-16 LAB
ARSENIC UR-MCNC: NORMAL UG/L (ref 0–9)
CADMIUM 24H UR-MCNC: NORMAL UG/L
CREAT UR-MCNC: 1.37 G/L (ref 0.3–3)
CRYOGLOB SER QL 1D COLD INC: NORMAL
LEAD 24H UR-MCNC: NORMAL UG/L (ref 0–49)
MERCURY 24H UR-MCNC: NORMAL UG/L (ref 0–19)

## 2022-05-17 ENCOUNTER — TELEPHONE (OUTPATIENT)
Dept: NEUROLOGY | Facility: CLINIC | Age: 52
End: 2022-05-17

## 2022-05-17 NOTE — TELEPHONE ENCOUNTER
Called patient and gave results.  Patient will start taking a multivitamin.    ----- Message from Narciso Yadav DNP, APRN sent at 5/17/2022  2:51 PM EDT -----  Please notify pt his tests for rheumatoid arthritis, autoimmune diseases of connective tissues, vit b12, folate, thyroid, heavy metal poisoning, immune labs, diabetes labs were normal. He has mild anemia, inflammation and muscle breakdown. These are mild. He can take a multiple vitamin.

## 2022-05-19 ENCOUNTER — PROCEDURE VISIT (OUTPATIENT)
Dept: NEUROLOGY | Facility: CLINIC | Age: 52
End: 2022-05-19

## 2022-05-19 DIAGNOSIS — G60.3 IDIOPATHIC PROGRESSIVE NEUROPATHY: Primary | ICD-10-CM

## 2022-05-19 LAB

## 2022-05-19 PROCEDURE — 95886 MUSC TEST DONE W/N TEST COMP: CPT | Performed by: PSYCHIATRY & NEUROLOGY

## 2022-05-19 PROCEDURE — 95911 NRV CNDJ TEST 9-10 STUDIES: CPT | Performed by: PSYCHIATRY & NEUROLOGY

## 2022-05-19 NOTE — PROGRESS NOTES
[facility]   Electrodiagnostic Laboratory    Nerve Conduction & EMG Report        Patient:  Nic Carey   Patient ID: [id number]   YOB: 1970  Sex:  male      Exam Physician:  Shivam Lopez MD  Refer Physician:  Narciso LANGE    Electromyogram and Nerve Conduction Velocity Procedure Note    Hx: 51 y.o. right handed male with complaint of numbness involving the the upper and lower extremities. Symptoms have been present for several years and were provoked by no clear event. Significant past medical history includes diabetes mellitus.  Family history no family history of nerve or muscle disease.    Exam: Motor power is normal. There is no atrophy. There are no fasciculations. Deep tendon reflexes are hypoactive. Sensory exam is abnormal distal symmetrical loss of pin and light touch in the B LE.     Edx studies of the L UE and LE were performed to evaluate for peripheral neuropathy.      NCS Examination   For sensory nerve conduction studies, the amplitude is measured peak-to-peak, the latency reported is the distal peak latency, and the conduction velocity, if measured, is determined from onset latencies and is over the forearm.   For motor nerve conduction studies, the amplitude is measured baseline-to-peak, the latency reported is the distal onset latency, the conduction velocity is calculated over the forearm, and the F wave latency is the minimum latency.   Unless otherwise noted, the hand temperature was monitored continuously and remained between 32°C and 36°C during the performance of the NCSs.        Sensory NCS      Nerve / Sites Rec. Site Onset Lat Peak Lat NP Amp PP Amp Segments Distance Velocity     ms ms µV µV  mm m/s   L Median - Digit II (Antidromic)      Wrist Dig II NR NR NR NR Wrist - Dig  NR   L Ulnar - Digit V (Antidromic)      Wrist Dig V 2.81 3.54 0.00 10.9 Wrist - Dig V 110 39   L Radial - Anatomical snuff box (Forearm)      Forearm Wrist  1.51 2.14 7.0 7.4 Forearm - Wrist 100 66   L Sural - Ankle (Calf)      Calf Ankle NR NR NR NR Calf - Ankle 140 NR                 Motor NCS      Nerve / Sites Muscle Latency Amplitude Amp % Duration Segments Distance Lat Diff Velocity     ms mV % ms  mm ms m/s   L Median - APB      Wrist APB 11.15 3.5 100  Wrist - APB 70        Elbow APB 18.59 0.6 17.1 8.65 Elbow - Wrist 260 7.45 35   L Ulnar - ADM      Wrist ADM 4.06 3.8 100 6.93 Wrist - ADM 70        B.Elbow ADM 9.69 1.7 45.2 9.11 B.Elbow - Wrist 230 5.63 41      A.Elbow ADM 10.57 1.8 47.6 8.49 A.Elbow - B.Elbow 70 0.89 79   L Peroneal - EDB      Ankle EDB NR NR NR NR Ankle - EDB 80     L Tibial - AH      Ankle AH NR NR NR NR Ankle - AH 80     L Peroneal - Tib Ant      Fib Head Tib Ant 3.96 0.8 100 8.18 Fib Head - Tib Ant         Pop fossa Tib Ant 5.42 1.5 204 10.68 Pop fossa - Fib Head 90 1.46 62                 F  Wave      Nerve F Lat M Lat F-M Lat    ms ms ms   L Ulnar - ADM 31.9 3.2 28.6         H Reflex      Nerve H Lat    ms   L Tibial - Soleus NR             EMG Examination   The study was performed with a concentric needle electrode. Fibrillation and fasciculation activity is graded from none (0) to continuous (4+). The configuration and recruitment pattern of motor unit action potentials under voluntary control, if not normal, are described below         EMG Summary Table     Spontaneous MUAP Recruitment   Muscle Nerve Roots IA Fib PSW Fasc H.F. Amp Dur. PPP Pattern   L. Abductor pollicis brevis Median C8-T1 N None None None None 1+ N N Reduced   L. First dorsal interosseous Ulnar C8-T1 N None None None None 1+ N N Reduced   L. Pronator teres Median C6-C7 N None None None None N N N N   L. Triceps brachii Radial C6-C8 N None None None None N N N N   L. Biceps brachii Musculocutaneous C5-C6 N None None None None N N N N   L. Deltoid Axillary C5-C6 N None None None None N N N N   L. Tibialis anterior Deep peroneal (Fibular) L4-L5 1+ 1+ 1+ None None 2+ N N  Reduced   L. Tibialis posterior Tibial L4-L5 1+ 1+ 1+ None None 2+ N N Reduced   L. Vastus lateralis Femoral L2-L4 N None None None None N N N N   L. Biceps femoris (long head) Sciatic (tibial division) L5-S2 N None None None None N N N N   L. Gastrocnemius (Lateral head) Tibial S1-S2 1+ 1+ 1+ None None 2+ N N Reduced   L. Iliopsoas Femoral L2-L3 N None None None None N N N N       Summary    The motor conduction test was performed on 5 nerve(s). There were no results within the specified normal range. Findings were unremarkable in 1 nerve(s): L Peroneal - Tib Ant. Results outside the specified normal range were found in 4 nerve(s), as follows:  • In the L Median - APB study  o the take off latency result was increased for Wrist stimulation  o the peak amplitude result was reduced for Wrist stimulation  o the take off velocity result was reduced for Elbow - Wrist segment  • In the L Ulnar - ADM study  o the take off latency result was increased for Wrist stimulation  o the peak amplitude result was reduced for Wrist stimulation  o the take off velocity result was reduced for B.Elbow - Wrist segment  • In the L Peroneal - EDB study  o the response was considered absent for Ankle stimulation  • In the L Tibial - AH study  o the response was considered absent for Ankle stimulation    The sensory conduction test had results outside of the specified normal range in all 4 of the tested nerves:  • In the L Median - Digit II (Antidromic) study  o the response was considered absent for Wrist stimulation  • In the L Ulnar - Digit V (Antidromic) study  o the peak latency result was increased for Wrist stimulation  o the peak amplitude result was reduced for Wrist stimulation  • In the L Radial - Anatomical snuff box (Forearm) study  o the peak amplitude result was reduced for Forearm stimulation  • In the L Sural - Ankle (Calf) study  o the response was considered absent for Calf stimulation    The F wave study was normal in  all 1 of the tested nerves: L Ulnar - ADM.    The H reflex study had results outside of the specified normal range in all 1 of the tested nerves:  • In the L Tibial - Soleus study  o the response was considered absent    The needle EMG examination was performed in 12 muscles. It was normal in 7 muscle(s): L. Pronator teres, L. Triceps brachii, L. Biceps brachii, L. Deltoid, L. Vastus lateralis, L. Biceps femoris (long head), L. Iliopsoas. The study was abnormal in 5 muscle(s), with the following distribution:  • Abnormal spontaneous/insertional activity was found in L. Tibialis anterior, L. Tibialis posterior, L. Gastrocnemius (Lateral head).  • The MUP waveform abnormality was found in L. Abductor pollicis brevis, L. First dorsal interosseous, L. Tibialis anterior, L. Tibialis posterior, L. Gastrocnemius (Lateral head).  • Abnormal interference pattern was found in L. Abductor pollicis brevis, L. First dorsal interosseous, L. Tibialis anterior, L. Tibialis posterior, L. Gastrocnemius (Lateral head).       Conclusion: This study showed neurophysiologic evidence of several abnormalities:    1.  Moderate to severe left median neuropathy at the wrist, ie carpal tunnel syndrome.  2.  Severe length dependent sensory motor polyneuropathy in L UE and LE.           Instrument used:  Teca Synergy        Performed by:          Shivam Lopez MD

## 2022-05-23 ENCOUNTER — TELEPHONE (OUTPATIENT)
Dept: NEUROLOGY | Facility: CLINIC | Age: 52
End: 2022-05-23

## 2022-05-23 NOTE — TELEPHONE ENCOUNTER
Called patient and could not leave  as inbox was not set up.    ----- Message from Narciso Yadav DNP, APRN sent at 5/20/2022  4:40 PM EDT -----  Lab for Lyme disease is negative. He has one antibody but does not meet criteria for diagnosis.

## 2022-05-24 ENCOUNTER — TELEPHONE (OUTPATIENT)
Dept: NEUROLOGY | Facility: CLINIC | Age: 52
End: 2022-05-24

## 2022-05-24 NOTE — TELEPHONE ENCOUNTER
Called patient for the third time.  Unable to leave message regarding results.      ----- Message from Narciso Yadav DNP, ANISA sent at 5/20/2022  4:40 PM EDT -----  Lab for Lyme disease is negative. He has one antibody but does not meet criteria for diagnosis.

## 2022-07-08 ENCOUNTER — OFFICE VISIT (OUTPATIENT)
Dept: NEUROLOGY | Facility: CLINIC | Age: 52
End: 2022-07-08

## 2022-07-08 VITALS
HEART RATE: 67 BPM | HEIGHT: 70 IN | SYSTOLIC BLOOD PRESSURE: 140 MMHG | DIASTOLIC BLOOD PRESSURE: 58 MMHG | WEIGHT: 315 LBS | TEMPERATURE: 96.9 F | BODY MASS INDEX: 45.1 KG/M2 | OXYGEN SATURATION: 94 %

## 2022-07-08 DIAGNOSIS — E11.42 DIABETIC POLYNEUROPATHY ASSOCIATED WITH TYPE 2 DIABETES MELLITUS: ICD-10-CM

## 2022-07-08 DIAGNOSIS — G56.02 CARPAL TUNNEL SYNDROME OF LEFT WRIST: Primary | ICD-10-CM

## 2022-07-08 DIAGNOSIS — E66.01 MORBID OBESITY WITH BODY MASS INDEX (BMI) OF 50.0 TO 59.9 IN ADULT: ICD-10-CM

## 2022-07-08 DIAGNOSIS — G47.33 SEVERE OBSTRUCTIVE SLEEP APNEA: ICD-10-CM

## 2022-07-08 PROCEDURE — 99214 OFFICE O/P EST MOD 30 MIN: CPT | Performed by: NURSE PRACTITIONER

## 2022-07-08 RX ORDER — LISINOPRIL 20 MG/1
20 TABLET ORAL DAILY
COMMUNITY
Start: 2022-06-01

## 2022-07-08 RX ORDER — DOXAZOSIN MESYLATE 1 MG/1
0.5 TABLET ORAL 2 TIMES DAILY
COMMUNITY
Start: 2022-06-20

## 2022-07-08 NOTE — PROGRESS NOTES
Neuro Office Visit      Encounter Date: 2022   Patient Name: Nic Carey  : 1970   MRN: [id number]   PCP: Dr Marin  Chief Complaint:    Chief Complaint   Patient presents with   • Idiopathic peripheral neuropathy       History of Present Illness: Nic Carey is a 51 y.o. male who is here today in Neurology for neuropathy.    Last visit  2 me-cont GBP, EMG, labs, fu w sleep med, encourage weight loss  Progress Notes by Shivam Lopez MD (2022 08:00)    1.  Moderate to severe left median neuropathy at the wrist, ie carpal tunnel syndrome.  2.  Severe length dependent sensory motor polyneuropathy in L UE and LE.     Labs: RF, kapil, vit b12, folate, tsh, heavy metals, lyme, Amy, A1c 5.5, cryoglobuline, crp, cmp, cbc-slight anemia  , crp 4.06      Neuropathy  Symptoms are worse in bilat hand with L>R. Dropping items.  Glucose controlled w A1c 5.5. Not drinking at this time. Had a recent trip and fall in the house.  Lower ext numbness is about the same and pain is controlled on GBP from PCP.      PH  1 year developed pain in bilat feet w pins and needles. Sx up to ankles. Intermittent. Sleeps with shoes on to control sx. Tx'd w GBP eased pain but not numbness. It is positional. No falls.   EMG BLE  showed neuropathy-data deficient.  2 months ago developed bilat hand numbness, tingling and burning. L>R. Ring finger and pinky are more involved. No weakness or dropping items. Denies neck pain and injury.    Sleep apnea  Did not schedule follow up appt with sleep provider.    PMH: T2DM A1c 7.0, sleep apena on bipap-noncompliant. Taking subsolv w history of opiate and meth abuse. Chronic low back pain. Previous heavy drinker. Last drink 20 years ago.  SH: Discectomy, ellyn  SH: denies etoh. Not skipping meals. Former smoker    Subjective      Past Medical History:   Past Medical History:   Diagnosis Date   • Acquired hypothyroidism    • Anxiety    • Chronic low back pain    •  Hypertension    • Mentally disabled    • Pre-diabetes    • Stage 2 chronic kidney disease        Past Surgical History:   Past Surgical History:   Procedure Laterality Date   • BACK SURGERY     • LAPAROSCOPIC CHOLECYSTECTOMY         Family History:   Family History   Problem Relation Age of Onset   • Hypertension Mother    • Sleep apnea Mother    • Diabetes Father    • Hypertension Father    • COPD Sister    • Stroke Brother    • Sleep apnea Brother    • Sleep apnea Brother    • Diabetes Brother        Social History:   Social History     Socioeconomic History   • Marital status:    Tobacco Use   • Smoking status: Former Smoker     Packs/day: 2.00     Types: Cigarettes     Quit date:      Years since quittin.5   • Smokeless tobacco: Never Used   Substance and Sexual Activity   • Alcohol use: Not Currently   • Drug use: Not Currently     Types: Marijuana     Comment: pain pills quit 2 yrs ago       Medications:     Current Outpatient Medications:   •  aspirin 81 MG tablet, Take 81 mg by mouth Daily., Disp: , Rfl:   •  buprenorphine-naloxone (SUBOXONE) 8-2 MG per SL tablet, TAKE 2 TABLETS BY MOUTH SUBLINGUALLY DAILY, Disp: , Rfl:   •  chlorthalidone (HYGROTON) 25 MG tablet, , Disp: , Rfl:   •  gabapentin (NEURONTIN) 300 MG capsule, Take 300 mg by mouth 3 (Three) Times a Day., Disp: , Rfl:   •  levothyroxine (SYNTHROID, LEVOTHROID) 25 MCG tablet, Take 25 mcg by mouth Daily., Disp: , Rfl:   •  metoprolol succinate XL (TOPROL-XL) 25 MG 24 hr tablet, , Disp: , Rfl:   •  ProAir  (90 Base) MCG/ACT inhaler, 2 puffs Every 4 (Four) Hours As Needed., Disp: , Rfl:   •  sertraline (ZOLOFT) 100 MG tablet, , Disp: , Rfl:   •  doxazosin (CARDURA) 1 MG tablet, Take 0.5 mg by mouth 2 (Two) Times a Day., Disp: , Rfl:   •  lisinopril (PRINIVIL,ZESTRIL) 20 MG tablet, Take 20 mg by mouth Daily., Disp: , Rfl:     Allergies:   Allergies   Allergen Reactions   • Ciprofloxacin Other (See Comments)     Impaired  speech       PHQ-9 Total Score:     [facility] Fall Risk Assessment has not been completed.    Objective     Physical Exam:   Physical Exam  Neurological:      Mental Status: He is oriented to person, place, and time.      Gait: Gait is intact.      Deep Tendon Reflexes:      Reflex Scores:       Tricep reflexes are 1+ on the right side and 1+ on the left side.       Bicep reflexes are 1+ on the right side and 1+ on the left side.       Brachioradialis reflexes are 1+ on the right side and 1+ on the left side.       Patellar reflexes are 1+ on the right side and 1+ on the left side.       Achilles reflexes are 1+ on the right side and 1+ on the left side.  Psychiatric:         Speech: Speech normal.         Neurologic Exam     Mental Status   Oriented to person, place, and time.   Follows 3 step commands.   Attention: normal. Concentration: normal.   Speech: speech is normal   Level of consciousness: alert  Knowledge: consistent with education.   Normal comprehension.     Cranial Nerves     CN III, IV, VI   Right pupil: Accommodation: intact.   Left pupil: Accommodation: intact.   CN III: no CN III palsy  CN VI: no CN VI palsy  Nystagmus: none   Diplopia: none  Upgaze: normal  Downgaze: normal  Conjugate gaze: present    CN VII   Facial expression full, symmetric.     CN VIII   Hearing: intact    Motor Exam   Muscle bulk: normal  Overall muscle tone: normal    Strength   Right biceps: 5/5  Left biceps: 5/5  Right triceps: 5/5  Left triceps: 5/5  Right interossei: 5/5  Left interossei: 5/5  Right quadriceps: 5/5  Left quadriceps: 5/5  Right anterior tibial: 5/5  Left anterior tibial: 5/5  Right posterior tibial: 5/5  Left posterior tibial: 5/5    Sensory Exam   Light touch normal.     Gait, Coordination, and Reflexes     Gait  Gait: normal    Tremor   Resting tremor: absent  Action tremor: absent    Reflexes   Right brachioradialis: 1+  Left brachioradialis: 1+  Right biceps: 1+  Left biceps: 1+  Right triceps: 1+  Left  "triceps: 1+  Right patellar: 1+  Left patellar: 1+  Right achilles: 1+  Left achilles: 1+  Right : 1+  Left : 1+       Vital Signs:   Vitals:    07/08/22 1009   BP: 140/58   Pulse: 67   Temp: 96.9 °F (36.1 °C)   SpO2: 94%   Weight: (!) 164 kg (362 lb)   Height: 177.8 cm (70\")     Body mass index is 51.94 kg/m².         Assessment / Plan      Assessment/Plan:   Diagnoses and all orders for this visit:    1. Carpal tunnel syndrome of left wrist (Primary)  Comments:  Wear left cock-up wrist splint until seen by Ortho.  Orders:  -     Ambulatory Referral to Orthopedic Surgery    2. Diabetic polyneuropathy associated with type 2 diabetes mellitus (HCC)  Comments:  Cont GBP per PCP. Eat reg meals. Control diabetes with A1c goal< 6.0. Avoid etoh.    3. Severe obstructive sleep apnea  Comments:  Pt to call and get follow up appt with ANISA Allen for follow up on EDGARD/ CPAP    4. Morbid obesity with body mass index (BMI) of 50.0 to 59.9 in adult (HCC)  Comments:  Encourage weight loss.       Patient Education:       Reviewed medications, potential side effects and signs and symptoms to report. Discussed risk versus benefits of treatment plan with patient and/or family-including medications, labs and radiology that may be ordered. Addressed questions and concerns during visit. Patient and/or family verbalized understanding and agree with plan. Instructed to call the office with any questions and report to ER with any life-threatening symptoms.     Follow Up:   Return if symptoms worsen or fail to improve.    During this visit the following were done:  Labs Reviewed [x]    Labs Ordered []    Radiology Reports Reviewed []    Radiology Ordered []    PCP Records Reviewed []    Referring Provider Records Reviewed []    ER Records Reviewed []    Hospital Records Reviewed []    History Obtained From Family []    Radiology Images Reviewed []    Other Reviewed [x]    Records Requested []      Narciso Yadav DNP, " APRN

## 2022-07-21 ENCOUNTER — OFFICE VISIT (OUTPATIENT)
Dept: ORTHOPEDIC SURGERY | Facility: CLINIC | Age: 52
End: 2022-07-21

## 2022-07-21 VITALS
HEIGHT: 70 IN | DIASTOLIC BLOOD PRESSURE: 72 MMHG | BODY MASS INDEX: 45.1 KG/M2 | WEIGHT: 315 LBS | SYSTOLIC BLOOD PRESSURE: 140 MMHG

## 2022-07-21 DIAGNOSIS — M79.641 BILATERAL HAND PAIN: Primary | ICD-10-CM

## 2022-07-21 DIAGNOSIS — M79.642 BILATERAL HAND PAIN: Primary | ICD-10-CM

## 2022-07-21 DIAGNOSIS — R20.2 NUMBNESS AND TINGLING IN RIGHT HAND: ICD-10-CM

## 2022-07-21 DIAGNOSIS — G56.02 CARPAL TUNNEL SYNDROME OF LEFT WRIST: ICD-10-CM

## 2022-07-21 DIAGNOSIS — R20.0 NUMBNESS AND TINGLING IN RIGHT HAND: ICD-10-CM

## 2022-07-21 PROCEDURE — 20526 THER INJECTION CARP TUNNEL: CPT | Performed by: PHYSICIAN ASSISTANT

## 2022-07-21 PROCEDURE — 99204 OFFICE O/P NEW MOD 45 MIN: CPT | Performed by: PHYSICIAN ASSISTANT

## 2022-07-21 RX ORDER — HYDROCHLOROTHIAZIDE 25 MG/1
25 TABLET ORAL EVERY MORNING
COMMUNITY
Start: 2022-07-12

## 2022-07-21 RX ORDER — OMEPRAZOLE 20 MG/1
20 CAPSULE, DELAYED RELEASE ORAL DAILY
COMMUNITY
Start: 2022-07-12

## 2022-07-21 RX ADMIN — LIDOCAINE HYDROCHLORIDE 1 ML: 10 INJECTION, SOLUTION EPIDURAL; INFILTRATION; INTRACAUDAL; PERINEURAL at 12:29

## 2022-07-21 RX ADMIN — TRIAMCINOLONE ACETONIDE 40 MG: 40 INJECTION, SUSPENSION INTRA-ARTICULAR; INTRAMUSCULAR at 12:29

## 2022-07-21 NOTE — PROGRESS NOTES
Procedure   - Hand/Upper Extremity Injection: L carpal tunnel for carpal tunnel syndrome on 7/21/2022 12:29 PM  Indications: pain  Details: 25 G needle, volar approach  Medications: 40 mg triamcinolone acetonide 40 MG/ML; 1 mL lidocaine PF 1% 1 %  Outcome: tolerated well, no immediate complications  Procedure, treatment alternatives, risks and benefits explained, specific risks discussed. Consent was given by the patient. Immediately prior to procedure a time out was called to verify the correct patient, procedure, equipment, support staff and site/side marked as required. Patient was prepped and draped in the usual sterile fashion.

## 2022-07-21 NOTE — PROGRESS NOTES
[facility] [place] [facility] Note    Subjective     Chief Complaint   Patient presents with   • Left Wrist - Numbness, Initial Evaluation      5 months numbness, tingling and burning of bilat hands. Left >R        HPI  Nic Carey is a 51 y.o. male.  Right-hand-dominant. New patient presents for evaluation of bilateral hand numbness and tingling. Patient referred for positive findings on EMG/NCS of left upper extremity.  Symptoms/pain have been ongoing for 5-6 months.  Left hand worse than right, 4-5 digits primarily but with holding objects notes numbness, burning throughout all digits.    Pain scale: 0/10. Quality of the pain burning.  Associated symptoms swelling.  Activity related to symptoms driving, sleeping.  Pain eased by nothing.  Prior treatments gabapentin.    Denies fever, chills, night sweats or other constitutional symptoms.      Past Medical History:   Diagnosis Date   • Acquired hypothyroidism    • Anxiety    • Chronic low back pain    • Hypertension    • Mentally disabled    • Pre-diabetes    • Stage 2 chronic kidney disease       Past Surgical History:   Procedure Laterality Date   • BACK SURGERY     • LAPAROSCOPIC CHOLECYSTECTOMY        Family History   Problem Relation Age of Onset   • Hypertension Mother    • Sleep apnea Mother    • Diabetes Father    • Hypertension Father    • COPD Sister    • Stroke Brother    • Sleep apnea Brother    • Sleep apnea Brother    • Diabetes Brother      Social History     Socioeconomic History   • Marital status:    Tobacco Use   • Smoking status: Former Smoker     Packs/day: 2.00     Types: Cigarettes     Quit date:      Years since quittin.5   • Smokeless tobacco: Never Used   Substance and Sexual Activity   • Alcohol use: Not Currently   • Drug use: Not Currently     Types: Marijuana     Comment: pain pills quit 2 yrs ago      Current Outpatient Medications on File Prior to Visit   Medication Sig Dispense Refill   • aspirin 81  "MG tablet Take 81 mg by mouth Daily.     • buprenorphine-naloxone (SUBOXONE) 8-2 MG per SL tablet TAKE 2 TABLETS BY MOUTH SUBLINGUALLY DAILY     • chlorthalidone (HYGROTON) 25 MG tablet      • doxazosin (CARDURA) 1 MG tablet Take 0.5 mg by mouth 2 (Two) Times a Day.     • gabapentin (NEURONTIN) 300 MG capsule Take 300 mg by mouth 3 (Three) Times a Day.     • hydroCHLOROthiazide (HYDRODIURIL) 25 MG tablet Take 25 mg by mouth Every Morning. for blood pressure.     • levothyroxine (SYNTHROID, LEVOTHROID) 25 MCG tablet Take 25 mcg by mouth Daily.     • lisinopril (PRINIVIL,ZESTRIL) 20 MG tablet Take 20 mg by mouth Daily.     • metoprolol succinate XL (TOPROL-XL) 25 MG 24 hr tablet      • omeprazole (priLOSEC) 20 MG capsule TAKE 1 CAPSULE BY MOUTH EVERY MORNING 30 TO 60 MINUTES BEFORE BREAKFAST     • ProAir  (90 Base) MCG/ACT inhaler 2 puffs Every 4 (Four) Hours As Needed.     • sertraline (ZOLOFT) 100 MG tablet        No current facility-administered medications on file prior to visit.      Allergies   Allergen Reactions   • Ciprofloxacin Other (See Comments)     Impaired speech        The following portions of the patient's history were reviewed and updated as appropriate: allergies, current medications, past family history, past medical history, past social history, past surgical history and problem list.    Review of Systems   Constitutional: Negative.    HENT: Negative.    Eyes: Negative.    Respiratory: Negative.    Cardiovascular: Negative.    Gastrointestinal: Negative.    Endocrine: Negative.    Genitourinary: Negative.    Musculoskeletal: Positive for arthralgias.   Skin: Negative.    Allergic/Immunologic: Negative.    Neurological: Negative.    Hematological: Negative.    Psychiatric/Behavioral: Negative.         Objective      Physical Exam  /72   Ht 177.8 cm (70\")   Wt (!) 164 kg (362 lb)   BMI 51.94 kg/m²     Body mass index is 51.94 kg/m².    GENERAL APPEARANCE: awake, alert & oriented x 3, " in no acute distress and well developed, well nourished  PSYCH: normal mood and affect  LUNGS:  breathing nonlabored, no wheezing  EYES: sclera anicteric, pupils equal  CARDIOVASCULAR: palpable pulses. Capillary refill less than 2 seconds  INTEGUMENTARY: skin intact, no clubbing, cyanosis        Ortho Exam  Peripheral Vascular   Bilateral Upper Extremity    No cyanotic nail beds    Pink nail beds and rapid capillary refill   Palpation    Radial Pulse - Bilaterally normal    Neurologic   Sensory: Light touch intact- Right and left hand    Left Upper Extremity    Left wrist extensors: 5/5    Left wrist flexors: 5/5    Left intrinsics: 5/5      Right Upper Extremity    Right wrist extensors: 5/5    Right wrist flexors: 5/5    Right intrinsics: 5/5    Musculoskeletal   Left Elbow    Forearm supination: AROM - 90 degrees    Forearm pronation: AROM - 90 degrees   Right Elbow    Forearm supination: AROM - 90 degrees    Forearm pronation: AROM - 90 degrees     Inspection and Palpation   Right Wrist      Tenderness - none    Swelling - none    Crepitus - none    Muscle tone - no atrophy   Left Wrist    Tenderness - none    Swelling - none    Crepitus - none    Muscle tone - no atrophy     ROJM:   Left Wrist    Flexion: AROM - 90 degrees    Extension: AROM - 90 degrees   Right Wrist    Flexion: AROM - 90 degrees    Extension: AROM - 90 degrees     Deformities, Malalignments, Discrepancies    None     Functional Testing   Right    Tinel's Sign-- negative    Phalen's Sign--positive    Carpal Compression Test--negative    Thenar wasting--none    APB--5/5    Elbow Flexion test--negative    Cubital tunnel signs--negative   Left     Tinel's Sign--positive    Phalen's Sign--positive    Carpal Compression Test-- positive    Thenar Wasting--minimal    APB--4+/5    Elbow Flexion test--negative    Cubital tunnel signs--negative       Strength and Tone    Right  strength: good    Left  strength: good     Hand Exam: Able to make  bilateral composite fists. No clawing of digits to left or right hand.      Imaging/Studies  Ordered bilateral hands plain films.  Imaging read/interpreted by Dr. Torres.    Indication: Bilateral hand pain     Comparison: No prior xrays are available for comparison     IMPRESSION:      Bilateral hand 3 views: Radiographs demonstrate no acute bony injury or fracture.  Moderate IP joint arthritis is seen throughout all digits as well as mild to moderate first CMC joint arthritis.  Otherwise normal bony alignment.    Reviewed EMG/NCS performed on 5/19/2022.  Left median nerve: Sensory latency NR.  Motor latency 11.15 ms.  Left ulnar nerve: Sensory latency 3.54 ms.  Motor latency 4.06 ms.  Impression: Moderate to severe left median neuropathy at the wrist.    Laboratory data  A1c of 5/12/2022--5.5.  Assessment/Plan        ICD-10-CM ICD-9-CM   1. Bilateral hand pain  M79.641 729.5    M79.642    2. Carpal tunnel syndrome of left wrist  G56.02 354.0   3. Numbness and tingling in right hand  R20.0 782.0    R20.2        Orders Placed This Encounter   Procedures   • - Hand/Upper Extremity Injection: L carpal tunnel   • XR Hand 3+ View Bilateral        -Bilateral hand pain with left carpal tunnel syndrome and right hand numbness and tingling.  -Patient was offered left CTR but declined due to family/personal issues.  -Understands that the longer nerve is compressed numbness and tingling could remain permanent.  -Offered and accepted corticosteroid to left carpal tunnel. Injection given today.  -Provided bilateral cock-up wrist splints to wear at night.  -Patient to continue with gabapentin as directed.  -Follow up in 6 weeks for repeat evaluation.  -Questions and concerns answered.    After discussing the risks, benefits, indications of injection, the patient gave consent to proceed.  Left wrist, carpal tunnel was confirmed as the correct site to be injected with a timeout.  It was then prepped using Hibiclens and injected  with a mixture of 1 cc of 1% plain lidocaine and 1 cc of Kenalog (40 mg per mL), without any resistance through the volar approach, patient in seated position.  Area was cleaned, hemostasis was achieved and a Band-Aid was applied over the injection site.  The patient tolerated procedure well.  I instructed the patient on signs and symptoms of infection.  They should report to the emergency department or return to clinic if any of these develop, for further evaluation and treatment.  Recommended modifying activity for the next 48 hours to include rest, ice, elevation and oral pain medication as needed.       Medical Decision Making  Management Options : prescription/IM medicine  Data/Risk: radiology tests, EMG/NCS tests      Chantelle Khan PA-C  07/21/22  12:38 EDT               EMR Dragon/Transcription disclaimer:  Much of this encounter note is an electronic transcription of spoken language to printed text. Electronic transcription of spoken language may permit erroneous, or at times, nonsensical words or phrases to be inadvertently transcribed. Although I have reviewed the note for such errors, some may still exist.

## 2022-07-25 RX ORDER — LIDOCAINE HYDROCHLORIDE 10 MG/ML
1 INJECTION, SOLUTION EPIDURAL; INFILTRATION; INTRACAUDAL; PERINEURAL
Status: COMPLETED | OUTPATIENT
Start: 2022-07-21 | End: 2022-07-21

## 2022-07-25 RX ORDER — TRIAMCINOLONE ACETONIDE 40 MG/ML
40 INJECTION, SUSPENSION INTRA-ARTICULAR; INTRAMUSCULAR
Status: COMPLETED | OUTPATIENT
Start: 2022-07-21 | End: 2022-07-21

## 2022-09-06 ENCOUNTER — OFFICE VISIT (OUTPATIENT)
Dept: ORTHOPEDIC SURGERY | Facility: CLINIC | Age: 52
End: 2022-09-06

## 2022-09-06 VITALS
DIASTOLIC BLOOD PRESSURE: 70 MMHG | HEIGHT: 70 IN | SYSTOLIC BLOOD PRESSURE: 118 MMHG | WEIGHT: 315 LBS | BODY MASS INDEX: 45.1 KG/M2

## 2022-09-06 DIAGNOSIS — G56.02 CARPAL TUNNEL SYNDROME OF LEFT WRIST: Primary | ICD-10-CM

## 2022-09-06 PROCEDURE — 99214 OFFICE O/P EST MOD 30 MIN: CPT | Performed by: PHYSICIAN ASSISTANT

## 2022-09-06 NOTE — PROGRESS NOTES
"    [facility] – [place] [facility] Clinic Note        Subjective     CC: Follow-up (6 week follow up; Bilateral hand pain-last left carpal tunnel cortisone injection given at last visit on 7/21/22)      NANCY Carey is a 52 y.o. male.  Patient returns for follow-up evaluation of his left wrist following corticosteroid injection given at last visit.  He had no response to the injection.  He continues to have pain with numbness and tingling into the entire left hand. Wakes him at night. Difficulty holding objects.    Pain scale: 4/10    Overall, patient's symptoms are unchanged.    ROS:    Constiutional:Pt denies fever, chills, nausea, or vomiting.  MSK:as above        Objective      Past Medical History  Past Medical History:   Diagnosis Date   • Acquired hypothyroidism    • Anxiety    • Chronic low back pain    • Hypertension    • Mentally disabled    • Pre-diabetes    • Stage 2 chronic kidney disease          Physical Exam  /70   Ht 177.8 cm (70\")   Wt (!) 161 kg (354 lb 9.6 oz)   BMI 50.88 kg/m²     Body mass index is 50.88 kg/m².    Patient is well nourished and well developed.        Ortho Exam  Left wrist/hand/digits  Tinel's Sign--positive  Phalen's Sign--positive  Carpal Compression Test-- positive  Thenar Wasting--positive  APB--4/5  Elbow Flexion test--negative  Cubital tunnel signs--negative    No clawing to digits.      Imaging/Labs/EMG Reviewed:  No new imaging.       Assessment:  1. Carpal tunnel syndrome of left wrist        Plan:  1. Left carpal tunnel syndrome--due to chronicity, exam and EMG/NCS findings patient was recommended for carpal tunnel release.  2. Discussed risks, benefits and indications of the procedure along with postoperative recovery and rehab course. Patient's wishes to proceed with left carpal tunnel release.   3. He understands following surgery is to avoid strenuous gripping, twisting, lifting greater than 4 pounds for minimum of 4 weeks.  4. He understands that with " the chronicity of the median nerve compression some fibers may take longer to heal. This means he could have persistent numbness and tingling for weeks to months following the procedure. He may even have some residual numbness and tingling does not completely resolve.  5. Patient was introduced to Dr. Corona.  6. For now will continue with bracing at night.  7. Recommend OTC NSAIDs/pain medication as needed.  8. Schedule for left carpal tunnel release.  9. Questions and concerns answered.     Indications, risks, benefits of surgical treatment were discussed with the patient. Surgical risks include but are not limited to pain, bleeding, infection, failure to relieve symptoms, need for further procedures, recurrence of symptoms, damage to healthy adjacent structures, stiffness, weakness, scar, DVT/PE, loss of limb or life. We also discussed the postoperative protocol and expected outcome. All questions were answered; the patient would like to proceed with surgical intervention.      Patient was also examined by Dr. Corona and he agrees with the above assessment and plan.      Chantelle Khan PA-C  09/06/22  14:15 EDT      Dictated Utilizing Dragon Dictation.

## 2022-09-07 NOTE — PROGRESS NOTES
I have reviewed the notes, assessments, and/or procedures performed by Chantelle Khan PA-C, I concur with her documentation of Nic Carey.      Patient seen and examined with CRD this afternoon.  Patient has hx of severe L CTS that did not improve after an injection.  Severe findings on EMG/NCV.  Given failure of non op tx, we recommended left carpal tunnel release.  The risks, benefits, and potential complications were discussed with the patient and he agreed to proceed.

## 2022-09-28 ENCOUNTER — PREP FOR SURGERY (OUTPATIENT)
Dept: OTHER | Facility: HOSPITAL | Age: 52
End: 2022-09-28

## 2022-09-28 DIAGNOSIS — G56.02 CARPAL TUNNEL SYNDROME OF LEFT WRIST: Primary | ICD-10-CM

## 2022-09-28 RX ORDER — ACETAMINOPHEN 500 MG
1000 TABLET ORAL ONCE
Status: CANCELLED | OUTPATIENT
Start: 2022-09-28 | End: 2022-09-28

## 2022-09-28 RX ORDER — CEFAZOLIN SODIUM IN 0.9 % NACL 3 G/100 ML
3 INTRAVENOUS SOLUTION, PIGGYBACK (ML) INTRAVENOUS ONCE
Status: CANCELLED | OUTPATIENT
Start: 2022-09-28 | End: 2022-09-28

## 2022-10-19 ENCOUNTER — PRE-ADMISSION TESTING (OUTPATIENT)
Dept: PREADMISSION TESTING | Facility: HOSPITAL | Age: 52
End: 2022-10-19

## 2022-10-19 VITALS — WEIGHT: 315 LBS | HEIGHT: 70 IN | BODY MASS INDEX: 45.1 KG/M2

## 2022-10-19 DIAGNOSIS — G56.02 CARPAL TUNNEL SYNDROME OF LEFT WRIST: ICD-10-CM

## 2022-10-19 LAB
ALBUMIN SERPL-MCNC: 4 G/DL (ref 3.5–5.2)
ALBUMIN/GLOB SERPL: 1.3 G/DL
ALP SERPL-CCNC: 84 U/L (ref 39–117)
ALT SERPL W P-5'-P-CCNC: 13 U/L (ref 1–41)
ANION GAP SERPL CALCULATED.3IONS-SCNC: 7 MMOL/L (ref 5–15)
AST SERPL-CCNC: 15 U/L (ref 1–40)
BASOPHILS # BLD AUTO: 0.04 10*3/MM3 (ref 0–0.2)
BASOPHILS NFR BLD AUTO: 0.6 % (ref 0–1.5)
BILIRUB SERPL-MCNC: 0.4 MG/DL (ref 0–1.2)
BUN SERPL-MCNC: 20 MG/DL (ref 6–20)
BUN/CREAT SERPL: 30.3 (ref 7–25)
CALCIUM SPEC-SCNC: 9.5 MG/DL (ref 8.6–10.5)
CHLORIDE SERPL-SCNC: 103 MMOL/L (ref 98–107)
CO2 SERPL-SCNC: 30 MMOL/L (ref 22–29)
CREAT SERPL-MCNC: 0.66 MG/DL (ref 0.76–1.27)
DEPRECATED RDW RBC AUTO: 47.3 FL (ref 37–54)
EGFRCR SERPLBLD CKD-EPI 2021: 112.2 ML/MIN/1.73
EOSINOPHIL # BLD AUTO: 0.29 10*3/MM3 (ref 0–0.4)
EOSINOPHIL NFR BLD AUTO: 4.1 % (ref 0.3–6.2)
ERYTHROCYTE [DISTWIDTH] IN BLOOD BY AUTOMATED COUNT: 14.8 % (ref 12.3–15.4)
GLOBULIN UR ELPH-MCNC: 3 GM/DL
GLUCOSE SERPL-MCNC: 129 MG/DL (ref 65–99)
HCT VFR BLD AUTO: 37.9 % (ref 37.5–51)
HGB BLD-MCNC: 12.1 G/DL (ref 13–17.7)
IMM GRANULOCYTES # BLD AUTO: 0.02 10*3/MM3 (ref 0–0.05)
IMM GRANULOCYTES NFR BLD AUTO: 0.3 % (ref 0–0.5)
LYMPHOCYTES # BLD AUTO: 1.74 10*3/MM3 (ref 0.7–3.1)
LYMPHOCYTES NFR BLD AUTO: 24.8 % (ref 19.6–45.3)
MCH RBC QN AUTO: 28.3 PG (ref 26.6–33)
MCHC RBC AUTO-ENTMCNC: 31.9 G/DL (ref 31.5–35.7)
MCV RBC AUTO: 88.6 FL (ref 79–97)
MONOCYTES # BLD AUTO: 0.45 10*3/MM3 (ref 0.1–0.9)
MONOCYTES NFR BLD AUTO: 6.4 % (ref 5–12)
NEUTROPHILS NFR BLD AUTO: 4.49 10*3/MM3 (ref 1.7–7)
NEUTROPHILS NFR BLD AUTO: 63.8 % (ref 42.7–76)
NRBC BLD AUTO-RTO: 0 /100 WBC (ref 0–0.2)
PLATELET # BLD AUTO: 217 10*3/MM3 (ref 140–450)
PMV BLD AUTO: 10.1 FL (ref 6–12)
POTASSIUM SERPL-SCNC: 4.2 MMOL/L (ref 3.5–5.2)
PROT SERPL-MCNC: 7 G/DL (ref 6–8.5)
RBC # BLD AUTO: 4.28 10*6/MM3 (ref 4.14–5.8)
SODIUM SERPL-SCNC: 140 MMOL/L (ref 136–145)
WBC NRBC COR # BLD: 7.03 10*3/MM3 (ref 3.4–10.8)

## 2022-10-19 PROCEDURE — 36415 COLL VENOUS BLD VENIPUNCTURE: CPT

## 2022-10-19 PROCEDURE — 93010 ELECTROCARDIOGRAM REPORT: CPT | Performed by: INTERNAL MEDICINE

## 2022-10-19 PROCEDURE — 80053 COMPREHEN METABOLIC PANEL: CPT

## 2022-10-19 PROCEDURE — 85025 COMPLETE CBC W/AUTO DIFF WBC: CPT

## 2022-10-19 PROCEDURE — 93005 ELECTROCARDIOGRAM TRACING: CPT

## 2022-10-19 NOTE — PAT
Date of Service: 01/31/2017    ONCOLOGY PROGRESS NOTE    Rhiannon returns today for further management of her microcytic anemia.  She has had a fairly extensive workup for this and she is here for the laboratory results.  She continues to complain of some fatigue.  It is worth noting her stool was negative for occult blood back in November.  She does have a regular menstrual cycle and it has been fairly normal every month.  She is on estrogen birth control medications.  She denies any history of blood donation.  She does note that her mother and sister are anemic as well.    I have reviewed the lab tests.  They are fairly consistently negative with a negative Christian test.  Normal sed rate.  Ferritin is 156 with an iron saturation of 15%, which is normal.  A hemoglobin electrophoresis is also normal.  Retic count 0.8%.  B12 and folic acid levels are normal.  CBC today shows a white count 5400, hematocrit 34, hemoglobin 11.3 and an MCV of 62.  Platelets are 258,000.    I also have reviewed the peripheral film today.  The red cell morphology is significantly microcytotic.  She also has poikilocytosis as well as target cells.  There are no real schistocytes present.  I believe this morphology is consistent with a thalassemia trait, since the hemoglobin electrophoresis was normal this must be an alpha thalassemia trait.  This explains her normal iron studies in the face of a microcytic, but very mild, anemia.  This may also be something in other members of her family.  We discussed that this could certainly be passed on from her to her children and from her mother to her.  It is often confused for iron deficiency and sometimes additional testing is performed.  I did spell out on a card for the patient on alpha thalassemia trait, so that she would know what to tell these other doctors with regard to her children.  There is a very small probability that patient could  somebody else with a thalassemia trait and this could  Patient instructed to drink 20 ounces of Gatorade and it needs to be completed 1 hour (for Main OR patients) or 2 hours (scheduled  section & [facility] patients) before given arrival time for procedure (NO RED Gatorade)    Patient verbalized understanding.      An arrival time for procedure was not provided during PAT visit. If patient had any questions or concerns about their arrival time, they were instructed to contact their surgeon/physician.  Additionally, if the patient referred to an arrival time that was acquired from their my chart account, patient was encouraged to verify that time with their surgeon/physician. Arrival times are NOT provided in Pre Admission Testing Department.      Patient denies any current skin issues.      cause patients with anemia.  I did suggest that at some point her children should be evaluated as well.  Otherwise, I do not need to follow her further as there really is no significant recommendation required for this, other than not to keep checking iron unless her anemia worsens.  I will refer her back to Dr. Salmeron, but be available if any other questions or concerns arise.    Twenty-five minutes were spent today reviewing this with the patient with the help of the  and answering her questions.  At least 50% of that time was face-to-face.      Dictated By: Oleg Santana MD  Signing Provider: MD JOSE Schultz/charlie ([id number])  DD: 01/31/2017 15:32:37 TD: 01/31/2017 22:52:30    Copy Sent To:

## 2022-10-20 LAB
QT INTERVAL: 430 MS
QTC INTERVAL: 422 MS

## 2022-10-25 ENCOUNTER — ANESTHESIA EVENT (OUTPATIENT)
Dept: PERIOP | Facility: HOSPITAL | Age: 52
End: 2022-10-25

## 2022-10-25 RX ORDER — FAMOTIDINE 10 MG/ML
20 INJECTION, SOLUTION INTRAVENOUS ONCE
Status: CANCELLED | OUTPATIENT
Start: 2022-10-25 | End: 2022-10-25

## 2022-10-26 ENCOUNTER — ANESTHESIA (OUTPATIENT)
Dept: PERIOP | Facility: HOSPITAL | Age: 52
End: 2022-10-26

## 2022-10-26 ENCOUNTER — HOSPITAL ENCOUNTER (OUTPATIENT)
Facility: HOSPITAL | Age: 52
Discharge: HOME | End: 2022-10-26
Attending: ORTHOPAEDIC SURGERY | Admitting: ORTHOPAEDIC SURGERY

## 2022-10-26 VITALS
DIASTOLIC BLOOD PRESSURE: 89 MMHG | HEART RATE: 54 BPM | HEIGHT: 70 IN | WEIGHT: 315 LBS | BODY MASS INDEX: 45.1 KG/M2 | RESPIRATION RATE: 16 BRPM | OXYGEN SATURATION: 94 % | TEMPERATURE: 97 F | SYSTOLIC BLOOD PRESSURE: 158 MMHG

## 2022-10-26 DIAGNOSIS — G56.02 CARPAL TUNNEL SYNDROME OF LEFT WRIST: ICD-10-CM

## 2022-10-26 LAB
GLUCOSE BLDC GLUCOMTR-MCNC: 107 MG/DL (ref 70–130)
POTASSIUM SERPL-SCNC: 3.9 MMOL/L (ref 3.5–5.2)

## 2022-10-26 PROCEDURE — 25010000002 PROPOFOL 10 MG/ML EMULSION: Performed by: NURSE ANESTHETIST, CERTIFIED REGISTERED

## 2022-10-26 PROCEDURE — 84132 ASSAY OF SERUM POTASSIUM: CPT | Performed by: ANESTHESIOLOGY

## 2022-10-26 PROCEDURE — 82962 GLUCOSE BLOOD TEST: CPT

## 2022-10-26 PROCEDURE — 25010000002 DEXAMETHASONE PER 1 MG: Performed by: NURSE ANESTHETIST, CERTIFIED REGISTERED

## 2022-10-26 PROCEDURE — 25010000002 ONDANSETRON PER 1 MG: Performed by: NURSE ANESTHETIST, CERTIFIED REGISTERED

## 2022-10-26 PROCEDURE — 64721 CARPAL TUNNEL SURGERY: CPT | Performed by: ORTHOPAEDIC SURGERY

## 2022-10-26 PROCEDURE — 25010000002 SUCCINYLCHOLINE PER 20 MG: Performed by: NURSE ANESTHETIST, CERTIFIED REGISTERED

## 2022-10-26 PROCEDURE — 25010000002 FENTANYL CITRATE (PF) 50 MCG/ML SOLUTION: Performed by: NURSE ANESTHETIST, CERTIFIED REGISTERED

## 2022-10-26 RX ORDER — SODIUM CHLORIDE, SODIUM LACTATE, POTASSIUM CHLORIDE, CALCIUM CHLORIDE 600; 310; 30; 20 MG/100ML; MG/100ML; MG/100ML; MG/100ML
9 INJECTION, SOLUTION INTRAVENOUS CONTINUOUS
Status: DISCONTINUED | OUTPATIENT
Start: 2022-10-26 | End: 2022-10-26 | Stop reason: HOSPADM

## 2022-10-26 RX ORDER — DEXAMETHASONE SODIUM PHOSPHATE 4 MG/ML
INJECTION, SOLUTION INTRA-ARTICULAR; INTRALESIONAL; INTRAMUSCULAR; INTRAVENOUS; SOFT TISSUE AS NEEDED
Status: DISCONTINUED | OUTPATIENT
Start: 2022-10-26 | End: 2022-10-26 | Stop reason: SURG

## 2022-10-26 RX ORDER — LIDOCAINE HYDROCHLORIDE 10 MG/ML
INJECTION, SOLUTION EPIDURAL; INFILTRATION; INTRACAUDAL; PERINEURAL AS NEEDED
Status: DISCONTINUED | OUTPATIENT
Start: 2022-10-26 | End: 2022-10-26 | Stop reason: SURG

## 2022-10-26 RX ORDER — MAGNESIUM HYDROXIDE 1200 MG/15ML
LIQUID ORAL AS NEEDED
Status: DISCONTINUED | OUTPATIENT
Start: 2022-10-26 | End: 2022-10-26 | Stop reason: HOSPADM

## 2022-10-26 RX ORDER — CEFAZOLIN SODIUM IN 0.9 % NACL 3 G/100 ML
3 INTRAVENOUS SOLUTION, PIGGYBACK (ML) INTRAVENOUS ONCE
Status: COMPLETED | OUTPATIENT
Start: 2022-10-26 | End: 2022-10-26

## 2022-10-26 RX ORDER — PROPOFOL 10 MG/ML
VIAL (ML) INTRAVENOUS AS NEEDED
Status: DISCONTINUED | OUTPATIENT
Start: 2022-10-26 | End: 2022-10-26 | Stop reason: SURG

## 2022-10-26 RX ORDER — OXYCODONE AND ACETAMINOPHEN 7.5; 325 MG/1; MG/1
1 TABLET ORAL ONCE AS NEEDED
Status: DISCONTINUED | OUTPATIENT
Start: 2022-10-26 | End: 2022-10-26 | Stop reason: HOSPADM

## 2022-10-26 RX ORDER — LIDOCAINE HYDROCHLORIDE 10 MG/ML
0.5 INJECTION, SOLUTION EPIDURAL; INFILTRATION; INTRACAUDAL; PERINEURAL ONCE AS NEEDED
Status: COMPLETED | OUTPATIENT
Start: 2022-10-26 | End: 2022-10-26

## 2022-10-26 RX ORDER — SUCCINYLCHOLINE CHLORIDE 20 MG/ML
INJECTION INTRAMUSCULAR; INTRAVENOUS AS NEEDED
Status: DISCONTINUED | OUTPATIENT
Start: 2022-10-26 | End: 2022-10-26 | Stop reason: SURG

## 2022-10-26 RX ORDER — ACETAMINOPHEN 500 MG
1000 TABLET ORAL ONCE
Status: COMPLETED | OUTPATIENT
Start: 2022-10-26 | End: 2022-10-26

## 2022-10-26 RX ORDER — ROCURONIUM BROMIDE 10 MG/ML
INJECTION, SOLUTION INTRAVENOUS AS NEEDED
Status: DISCONTINUED | OUTPATIENT
Start: 2022-10-26 | End: 2022-10-26 | Stop reason: SURG

## 2022-10-26 RX ORDER — FENTANYL CITRATE 50 UG/ML
50 INJECTION, SOLUTION INTRAMUSCULAR; INTRAVENOUS
Status: DISCONTINUED | OUTPATIENT
Start: 2022-10-26 | End: 2022-10-26 | Stop reason: HOSPADM

## 2022-10-26 RX ORDER — IBUPROFEN 800 MG/1
800 TABLET ORAL EVERY 8 HOURS PRN
Qty: 60 TABLET | Refills: 0 | Status: SHIPPED | OUTPATIENT
Start: 2022-10-26 | End: 2022-11-17

## 2022-10-26 RX ORDER — FENTANYL CITRATE 50 UG/ML
INJECTION, SOLUTION INTRAMUSCULAR; INTRAVENOUS AS NEEDED
Status: DISCONTINUED | OUTPATIENT
Start: 2022-10-26 | End: 2022-10-26 | Stop reason: SURG

## 2022-10-26 RX ORDER — SODIUM CHLORIDE 0.9 % (FLUSH) 0.9 %
10 SYRINGE (ML) INJECTION EVERY 12 HOURS SCHEDULED
Status: DISCONTINUED | OUTPATIENT
Start: 2022-10-26 | End: 2022-10-26 | Stop reason: HOSPADM

## 2022-10-26 RX ORDER — GLYCOPYRROLATE 0.2 MG/ML
INJECTION INTRAMUSCULAR; INTRAVENOUS AS NEEDED
Status: DISCONTINUED | OUTPATIENT
Start: 2022-10-26 | End: 2022-10-26 | Stop reason: SURG

## 2022-10-26 RX ORDER — HYDROMORPHONE HYDROCHLORIDE 1 MG/ML
0.5 INJECTION, SOLUTION INTRAMUSCULAR; INTRAVENOUS; SUBCUTANEOUS
Status: DISCONTINUED | OUTPATIENT
Start: 2022-10-26 | End: 2022-10-26 | Stop reason: HOSPADM

## 2022-10-26 RX ORDER — BUPIVACAINE HYDROCHLORIDE 2.5 MG/ML
INJECTION, SOLUTION INFILTRATION; PERINEURAL AS NEEDED
Status: DISCONTINUED | OUTPATIENT
Start: 2022-10-26 | End: 2022-10-26 | Stop reason: HOSPADM

## 2022-10-26 RX ORDER — FAMOTIDINE 20 MG/1
20 TABLET, FILM COATED ORAL ONCE
Status: COMPLETED | OUTPATIENT
Start: 2022-10-26 | End: 2022-10-26

## 2022-10-26 RX ORDER — MIDAZOLAM HYDROCHLORIDE 1 MG/ML
1 INJECTION INTRAMUSCULAR; INTRAVENOUS
Status: DISCONTINUED | OUTPATIENT
Start: 2022-10-26 | End: 2022-10-26 | Stop reason: HOSPADM

## 2022-10-26 RX ORDER — ONDANSETRON 2 MG/ML
INJECTION INTRAMUSCULAR; INTRAVENOUS AS NEEDED
Status: DISCONTINUED | OUTPATIENT
Start: 2022-10-26 | End: 2022-10-26 | Stop reason: SURG

## 2022-10-26 RX ORDER — SODIUM CHLORIDE 0.9 % (FLUSH) 0.9 %
10 SYRINGE (ML) INJECTION AS NEEDED
Status: DISCONTINUED | OUTPATIENT
Start: 2022-10-26 | End: 2022-10-26 | Stop reason: HOSPADM

## 2022-10-26 RX ADMIN — FENTANYL CITRATE 100 MCG: 50 INJECTION, SOLUTION INTRAMUSCULAR; INTRAVENOUS at 14:03

## 2022-10-26 RX ADMIN — Medication 3 G: at 14:09

## 2022-10-26 RX ADMIN — ROCURONIUM BROMIDE 10 MG: 10 INJECTION, SOLUTION INTRAVENOUS at 14:03

## 2022-10-26 RX ADMIN — FAMOTIDINE 20 MG: 20 TABLET ORAL at 11:49

## 2022-10-26 RX ADMIN — SODIUM CHLORIDE, POTASSIUM CHLORIDE, SODIUM LACTATE AND CALCIUM CHLORIDE 9 ML/HR: 600; 310; 30; 20 INJECTION, SOLUTION INTRAVENOUS at 11:49

## 2022-10-26 RX ADMIN — GLYCOPYRROLATE 0.4 MG: 0.2 INJECTION INTRAMUSCULAR; INTRAVENOUS at 14:11

## 2022-10-26 RX ADMIN — ACETAMINOPHEN 1000 MG: 500 TABLET ORAL at 11:48

## 2022-10-26 RX ADMIN — PROPOFOL 300 MG: 10 INJECTION, EMULSION INTRAVENOUS at 14:03

## 2022-10-26 RX ADMIN — LIDOCAINE HYDROCHLORIDE 100 MG: 10 INJECTION, SOLUTION EPIDURAL; INFILTRATION; INTRACAUDAL; PERINEURAL at 14:03

## 2022-10-26 RX ADMIN — DEXAMETHASONE SODIUM PHOSPHATE 4 MG: 4 INJECTION, SOLUTION INTRA-ARTICULAR; INTRALESIONAL; INTRAMUSCULAR; INTRAVENOUS; SOFT TISSUE at 14:10

## 2022-10-26 RX ADMIN — SUCCINYLCHOLINE CHLORIDE 200 MG: 20 INJECTION, SOLUTION INTRAMUSCULAR; INTRAVENOUS at 14:03

## 2022-10-26 RX ADMIN — ONDANSETRON 4 MG: 2 INJECTION INTRAMUSCULAR; INTRAVENOUS at 14:26

## 2022-10-26 RX ADMIN — LIDOCAINE HYDROCHLORIDE 0.5 ML: 10 INJECTION, SOLUTION EPIDURAL; INFILTRATION; INTRACAUDAL; PERINEURAL at 11:49

## 2022-10-26 NOTE — ANESTHESIA PREPROCEDURE EVALUATION
" Anesthesia Evaluation     Patient summary reviewed and Nursing notes reviewed   history of anesthetic complications: PONV               Airway   Mallampati: I  TM distance: >3 FB  Neck ROM: full  No difficulty expected  Dental - normal exam   (+) edentulous    Pulmonary - normal exam   (+) a smoker Former, sleep apnea,   Cardiovascular - normal exam    (+) hypertension,       Neuro/Psych  (+) numbness, psychiatric history Anxiety,    GI/Hepatic/Renal/Endo    (+) morbid obesity,  renal disease CRI, diabetes mellitus (\" PRE-DIABETIC \"), thyroid problem hypothyroidism    Musculoskeletal     (+) back pain, chronic pain,   Abdominal  - normal exam    Bowel sounds: normal.   Substance History   (+) drug use (H/O OPIATE USE, ON SUBOXONE)     OB/GYN negative ob/gyn ROS         Other                        Anesthesia Plan    ASA 3     general     intravenous induction     Anesthetic plan, risks, benefits, and alternatives have been provided, discussed and informed consent has been obtained with: patient.    Plan discussed with CRNA.        CODE STATUS:       "

## 2022-10-26 NOTE — ANESTHESIA POSTPROCEDURE EVALUATION
Patient: Nic Carey    Procedure Summary     Date: 10/26/22 Room / Location:  [facility]    Anesthesia Start: 1358 Anesthesia Stop: 1501    Procedure: CARPAL TUNNEL RELEASE- LEFT (Left: Hand) Diagnosis:       Carpal tunnel syndrome of left wrist      (Carpal tunnel syndrome of left wrist [G56.02])    Surgeons: Gatito Corona MD Provider: Trevin Lu MD    Anesthesia Type: general ASA Status: 3          Anesthesia Type: general    Vitals  No vitals data found for the desired time range.          Post Anesthesia Care and Evaluation    Patient location during evaluation: PACU  Patient participation: complete - patient participated  Level of consciousness: awake and alert  Pain management: adequate    Airway patency: patent  Anesthetic complications: No anesthetic complications  PONV Status: none  Cardiovascular status: hemodynamically stable and acceptable  Respiratory status: nonlabored ventilation, acceptable and nasal cannula  Hydration status: acceptable

## 2022-10-26 NOTE — ANESTHESIA PROCEDURE NOTES
Airway  Urgency: elective    Airway not difficult    General Information and Staff    Patient location during procedure: OR  SRNA: Diana Calvo SRNA  Indications and Patient Condition  Indications for airway management: airway protection    Preoxygenated: yes  MILS not maintained throughout  Mask difficulty assessment: 0 - not attempted    Final Airway Details  Final airway type: endotracheal airway      Successful airway: ETT  Cuffed: yes   Successful intubation technique: video laryngoscopy  Endotracheal tube insertion site: oral  Blade: Barrera  Blade size: 3  ETT size (mm): 7.5  Cormack-Lehane Classification: grade I - full view of glottis  Placement verified by: chest auscultation and capnometry   Measured from: lips  ETT/EBT  to lips (cm): 22  Number of attempts at approach: 1  Assessment: lips, teeth, and gum same as pre-op and atraumatic intubation    Additional Comments  Negative epigastric sounds, Breath sound equal bilaterally with symmetric chest rise and fall

## 2022-11-10 ENCOUNTER — OFFICE VISIT (OUTPATIENT)
Dept: ORTHOPEDIC SURGERY | Facility: CLINIC | Age: 52
End: 2022-11-10

## 2022-11-10 VITALS — TEMPERATURE: 96.9 F

## 2022-11-10 DIAGNOSIS — G56.02 CARPAL TUNNEL SYNDROME OF LEFT WRIST: ICD-10-CM

## 2022-11-10 DIAGNOSIS — Z98.890 S/P CARPAL TUNNEL RELEASE: Primary | ICD-10-CM

## 2022-11-10 PROCEDURE — 99024 POSTOP FOLLOW-UP VISIT: CPT | Performed by: PHYSICIAN ASSISTANT

## 2022-11-10 NOTE — PROGRESS NOTES
[facility] [place] [facility] Note        Subjective     Post-op (2 weeks s/p CARPAL TUNNEL RELEASE- LEFT 10/26/22)       HPI    Nic Carey is a 52 y.o. male.  Patient presents for their initial postop visit following left carpal tunnel release performed on the above date by Dr. Corona.    Pain scale: 6/10.  Patient continues to have numbness and tingling in his left hand with only slight improvement of the radial 3 digits but no significant change to the fourth and fifth digits.  He notes swelling and stiffness to the hand.  Has been treating with elevation and ice.      Once again with his history of diabetes and the chronicity of the median nerve compression, patient understood that could be the possibility of persistent numbness and tingling for weeks to months even up to 1 year or with those fibers that were significantly damaged residual numbness and tingling might not not completely resolve.    Patient denies any fever, chills, night sweats or other constitutional symptoms.          Objective      Physical Exam  Temp 96.9 °F (36.1 °C)     There is no height or weight on file to calculate BMI.        Ortho Exam    Left Upper Extremity:        Musculoskeletal     Inspection and Palpation:      Hand/Wrist:      Tenderness -mild to moderate incisional    Swelling - minimal     ROM:  Slightly diminished but within normal limits.  Only able to make loose composite fist.       Incision:  Healing appropriately with sutures in place.  Slight gapping and noted of the incision but appears to be healing well from inside out.  No redness, warmth, drainage or evidence of infection.      Imaging Reviewed:  No new imaging today.      Assessment:  1. S/P carpal tunnel release    2. Carpal tunnel syndrome of left wrist        Plan:  1. Status post left carpal tunnel release, stable.  2. Sutures were removed today.  3. Offered to send patient to formal PT but he politely declined.  4. Patient provided home hand  exercises.  5. Discussed soft tissue massage to the incision.  6. Still no strenuous gripping, grasping or lifting more than 4 pounds for an additional 2 to 3 weeks minimum.  7. Recommend OTC NSAIDs/pain medication as needed.  8. Follow up in 3 weeks for repeat evaluation.  9. Questions and concerns answered.      Chantelle Khan PA-C  11/10/22  17:05 EST      Dictated Utilizing Dragon Dictation.

## 2022-11-17 RX ORDER — IBUPROFEN 800 MG/1
800 TABLET ORAL EVERY 8 HOURS PRN
Qty: 60 TABLET | Refills: 0 | Status: SHIPPED | OUTPATIENT
Start: 2022-11-17 | End: 2022-12-07

## 2022-12-07 RX ORDER — IBUPROFEN 800 MG/1
800 TABLET ORAL EVERY 8 HOURS PRN
Qty: 60 TABLET | Refills: 0 | Status: SHIPPED | OUTPATIENT
Start: 2022-12-07 | End: 2023-01-05

## 2023-01-05 RX ORDER — IBUPROFEN 800 MG/1
800 TABLET ORAL EVERY 8 HOURS PRN
Qty: 60 TABLET | Refills: 0 | Status: SHIPPED | OUTPATIENT
Start: 2023-01-05 | End: 2023-01-23

## 2023-01-19 ENCOUNTER — OFFICE VISIT (OUTPATIENT)
Dept: ORTHOPEDIC SURGERY | Facility: CLINIC | Age: 53
End: 2023-01-19
Payer: COMMERCIAL

## 2023-01-19 DIAGNOSIS — Z98.890 S/P CARPAL TUNNEL RELEASE: Primary | ICD-10-CM

## 2023-01-19 DIAGNOSIS — G56.02 CARPAL TUNNEL SYNDROME OF LEFT WRIST: ICD-10-CM

## 2023-01-19 PROCEDURE — 99024 POSTOP FOLLOW-UP VISIT: CPT | Performed by: PHYSICIAN ASSISTANT

## 2023-01-19 RX ORDER — MULTIVITAMIN
TABLET ORAL
COMMUNITY
Start: 2023-01-16

## 2023-01-19 RX ORDER — FUROSEMIDE 20 MG/1
TABLET ORAL
COMMUNITY
Start: 2023-01-17

## 2023-01-19 NOTE — PROGRESS NOTES
[facility] [place] [facility] Note        Subjective     Post-op Follow-up (2 month f/u; 12 weeks s/p CARPAL TUNNEL RELEASE- LEFT 10/26/22)       NANCY Carey is a 52 y.o. male.  Patient returns for follow-up status post left carpal tunnel performed on the above-stated date by Dr. Corona.  Patient continues to have numbness and tingling throughout all digits.  He reports it has not worsened since surgery but it has not improved much.    Still notes difficulty with gripping and grasping activities.    Overall feels as if symptoms are unchanged since surgery.    No reported fever, chills, night sweats or other constitutional symptoms.          Objective      Physical Exam  There were no vitals taken for this visit.    There is no height or weight on file to calculate BMI.        Ortho Exam  Left wrist  Skin: Surgical incision site well-healed without redness, warmth, drainage or evidence of infection.  Tenderness: Still has some minimal incisional tenderness.  Motion: Wrist is within normal limits.  Able to make composite fist loosely.  Motor: Grossly intact radial, ulnar, median, AIN, PIN.  Sensory: Still notes numbness and tingling along median and ulnar nerve distribution.  Vascular: 2+ radial pulse with brisk capillary refill noted and each digit.      Imaging Reviewed:  No new imaging today.      Assessment:  1. S/P carpal tunnel release    2. Carpal tunnel syndrome of left wrist        Plan:  1. Status post left carpal tunnel release with persistent numbness and tingling but no worse since having prior to surgery.  2. Again offered to send patient to formal PT/OT but he declined.  3. He will continue with home hand exercises.  4. Again discussed with his history of diabetes and chronicity of the median nerve compression he could have persistent numbness and tingling for months even up to a year, in addition to residual numbness and tingling that may not completely resolve.  Patient verbalized  understanding.  5. Recommend OTC NSAIDs/pain medication as needed.  6. Follow up in 6 months for repeat evaluation, sooner if issues arise or symptoms worsen/change.  7. Questions and concerns answered.      Chantelle Khan PA-C  01/23/23  08:05 EST      Dictated Utilizing Dragon Dictation.

## 2023-01-23 RX ORDER — IBUPROFEN 800 MG/1
800 TABLET ORAL EVERY 8 HOURS PRN
Qty: 60 TABLET | Refills: 0 | Status: SHIPPED | OUTPATIENT
Start: 2023-01-23

## 2023-04-27 ENCOUNTER — APPOINTMENT (OUTPATIENT)
Dept: CT IMAGING | Facility: HOSPITAL | Age: 53
End: 2023-04-27
Payer: COMMERCIAL

## 2023-04-27 ENCOUNTER — APPOINTMENT (OUTPATIENT)
Dept: CARDIOLOGY | Facility: HOSPITAL | Age: 53
End: 2023-04-27
Payer: COMMERCIAL

## 2023-04-27 ENCOUNTER — HOSPITAL ENCOUNTER (EMERGENCY)
Facility: HOSPITAL | Age: 53
Discharge: HOME | End: 2023-04-27
Attending: EMERGENCY MEDICINE
Payer: COMMERCIAL

## 2023-04-27 VITALS
BODY MASS INDEX: 45.1 KG/M2 | HEIGHT: 70 IN | RESPIRATION RATE: 18 BRPM | DIASTOLIC BLOOD PRESSURE: 76 MMHG | OXYGEN SATURATION: 98 % | WEIGHT: 315 LBS | HEART RATE: 59 BPM | SYSTOLIC BLOOD PRESSURE: 150 MMHG | TEMPERATURE: 98.8 F

## 2023-04-27 DIAGNOSIS — R91.8 PULMONARY NODULES: ICD-10-CM

## 2023-04-27 DIAGNOSIS — R06.00 DYSPNEA, UNSPECIFIED TYPE: Primary | ICD-10-CM

## 2023-04-27 DIAGNOSIS — R79.89 ELEVATED D-DIMER: ICD-10-CM

## 2023-04-27 DIAGNOSIS — L03.119 CELLULITIS OF LOWER EXTREMITY, UNSPECIFIED LATERALITY: ICD-10-CM

## 2023-04-27 DIAGNOSIS — R60.0 LOWER EXTREMITY EDEMA: ICD-10-CM

## 2023-04-27 LAB
ALBUMIN SERPL-MCNC: 4.1 G/DL (ref 3.5–5.2)
ALBUMIN/GLOB SERPL: 1.5 G/DL
ALP SERPL-CCNC: 79 U/L (ref 39–117)
ALT SERPL W P-5'-P-CCNC: 14 U/L (ref 1–41)
ANION GAP SERPL CALCULATED.3IONS-SCNC: 10 MMOL/L (ref 5–15)
AST SERPL-CCNC: 15 U/L (ref 1–40)
BASOPHILS # BLD AUTO: 0.05 10*3/MM3 (ref 0–0.2)
BASOPHILS NFR BLD AUTO: 0.8 % (ref 0–1.5)
BH CV LOWER VASCULAR LEFT COMMON FEMORAL AUGMENT: NORMAL
BH CV LOWER VASCULAR LEFT COMMON FEMORAL COMPETENT: NORMAL
BH CV LOWER VASCULAR LEFT COMMON FEMORAL COMPRESS: NORMAL
BH CV LOWER VASCULAR LEFT COMMON FEMORAL PHASIC: NORMAL
BH CV LOWER VASCULAR LEFT COMMON FEMORAL SPONT: NORMAL
BH CV LOWER VASCULAR LEFT DISTAL FEMORAL COMPRESS: NORMAL
BH CV LOWER VASCULAR LEFT GASTRONEMIUS COMPRESS: NORMAL
BH CV LOWER VASCULAR LEFT GREATER SAPH AK COMPRESS: NORMAL
BH CV LOWER VASCULAR LEFT GREATER SAPH BK COMPRESS: NORMAL
BH CV LOWER VASCULAR LEFT LESSER SAPH COMPRESS: NORMAL
BH CV LOWER VASCULAR LEFT MID FEMORAL AUGMENT: NORMAL
BH CV LOWER VASCULAR LEFT MID FEMORAL COMPETENT: NORMAL
BH CV LOWER VASCULAR LEFT MID FEMORAL COMPRESS: NORMAL
BH CV LOWER VASCULAR LEFT MID FEMORAL PHASIC: NORMAL
BH CV LOWER VASCULAR LEFT MID FEMORAL SPONT: NORMAL
BH CV LOWER VASCULAR LEFT PERONEAL COMPRESS: NORMAL
BH CV LOWER VASCULAR LEFT POPLITEAL AUGMENT: NORMAL
BH CV LOWER VASCULAR LEFT POPLITEAL COMPETENT: NORMAL
BH CV LOWER VASCULAR LEFT POPLITEAL COMPRESS: NORMAL
BH CV LOWER VASCULAR LEFT POPLITEAL PHASIC: NORMAL
BH CV LOWER VASCULAR LEFT POPLITEAL SPONT: NORMAL
BH CV LOWER VASCULAR LEFT POSTERIOR TIBIAL COMPRESS: NORMAL
BH CV LOWER VASCULAR LEFT PROFUNDA FEMORAL COMPRESS: NORMAL
BH CV LOWER VASCULAR LEFT PROXIMAL FEMORAL COMPRESS: NORMAL
BH CV LOWER VASCULAR LEFT SAPHENOFEMORAL JUNCTION COMPRESS: NORMAL
BH CV LOWER VASCULAR RIGHT COMMON FEMORAL AUGMENT: NORMAL
BH CV LOWER VASCULAR RIGHT COMMON FEMORAL COMPETENT: NORMAL
BH CV LOWER VASCULAR RIGHT COMMON FEMORAL COMPRESS: NORMAL
BH CV LOWER VASCULAR RIGHT COMMON FEMORAL PHASIC: NORMAL
BH CV LOWER VASCULAR RIGHT COMMON FEMORAL SPONT: NORMAL
BH CV LOWER VASCULAR RIGHT DISTAL FEMORAL COMPRESS: NORMAL
BH CV LOWER VASCULAR RIGHT GASTRONEMIUS COMPRESS: NORMAL
BH CV LOWER VASCULAR RIGHT GREATER SAPH AK COMPRESS: NORMAL
BH CV LOWER VASCULAR RIGHT GREATER SAPH BK COMPRESS: NORMAL
BH CV LOWER VASCULAR RIGHT LESSER SAPH COMPRESS: NORMAL
BH CV LOWER VASCULAR RIGHT MID FEMORAL AUGMENT: NORMAL
BH CV LOWER VASCULAR RIGHT MID FEMORAL COMPETENT: NORMAL
BH CV LOWER VASCULAR RIGHT MID FEMORAL COMPRESS: NORMAL
BH CV LOWER VASCULAR RIGHT MID FEMORAL PHASIC: NORMAL
BH CV LOWER VASCULAR RIGHT MID FEMORAL SPONT: NORMAL
BH CV LOWER VASCULAR RIGHT PERONEAL COMPRESS: NORMAL
BH CV LOWER VASCULAR RIGHT POPLITEAL AUGMENT: NORMAL
BH CV LOWER VASCULAR RIGHT POPLITEAL COMPETENT: NORMAL
BH CV LOWER VASCULAR RIGHT POPLITEAL COMPRESS: NORMAL
BH CV LOWER VASCULAR RIGHT POPLITEAL PHASIC: NORMAL
BH CV LOWER VASCULAR RIGHT POPLITEAL SPONT: NORMAL
BH CV LOWER VASCULAR RIGHT POSTERIOR TIBIAL COMPRESS: NORMAL
BH CV LOWER VASCULAR RIGHT PROFUNDA FEMORAL COMPRESS: NORMAL
BH CV LOWER VASCULAR RIGHT PROXIMAL FEMORAL COMPRESS: NORMAL
BH CV LOWER VASCULAR RIGHT SAPHENOFEMORAL JUNCTION COMPRESS: NORMAL
BH CV VAS PRELIMINARY FINDINGS SCRIPTING: 1
BILIRUB SERPL-MCNC: 0.5 MG/DL (ref 0–1.2)
BUN SERPL-MCNC: 18 MG/DL (ref 6–20)
BUN/CREAT SERPL: 25 (ref 7–25)
CALCIUM SPEC-SCNC: 9.2 MG/DL (ref 8.6–10.5)
CHLORIDE SERPL-SCNC: 102 MMOL/L (ref 98–107)
CO2 SERPL-SCNC: 29 MMOL/L (ref 22–29)
CREAT BLDA-MCNC: 0.5 MG/DL
CREAT BLDA-MCNC: 0.5 MG/DL (ref 0.6–1.3)
CREAT SERPL-MCNC: 0.72 MG/DL (ref 0.76–1.27)
DEPRECATED RDW RBC AUTO: 47.1 FL (ref 37–54)
EGFRCR SERPLBLD CKD-EPI 2021: 109.2 ML/MIN/1.73
EOSINOPHIL # BLD AUTO: 0.2 10*3/MM3 (ref 0–0.4)
EOSINOPHIL NFR BLD AUTO: 3.1 % (ref 0.3–6.2)
ERYTHROCYTE [DISTWIDTH] IN BLOOD BY AUTOMATED COUNT: 14.4 % (ref 12.3–15.4)
GLOBULIN UR ELPH-MCNC: 2.8 GM/DL
GLUCOSE SERPL-MCNC: 96 MG/DL (ref 65–99)
HCT VFR BLD AUTO: 37.7 % (ref 37.5–51)
HGB BLD-MCNC: 11.9 G/DL (ref 13–17.7)
HOLD SPECIMEN: NORMAL
IMM GRANULOCYTES # BLD AUTO: 0.02 10*3/MM3 (ref 0–0.05)
IMM GRANULOCYTES NFR BLD AUTO: 0.3 % (ref 0–0.5)
LYMPHOCYTES # BLD AUTO: 1.47 10*3/MM3 (ref 0.7–3.1)
LYMPHOCYTES NFR BLD AUTO: 22.9 % (ref 19.6–45.3)
MAXIMAL PREDICTED HEART RATE: 167 BPM
MCH RBC QN AUTO: 28.7 PG (ref 26.6–33)
MCHC RBC AUTO-ENTMCNC: 31.6 G/DL (ref 31.5–35.7)
MCV RBC AUTO: 90.8 FL (ref 79–97)
MONOCYTES # BLD AUTO: 0.53 10*3/MM3 (ref 0.1–0.9)
MONOCYTES NFR BLD AUTO: 8.3 % (ref 5–12)
NEUTROPHILS NFR BLD AUTO: 4.15 10*3/MM3 (ref 1.7–7)
NEUTROPHILS NFR BLD AUTO: 64.6 % (ref 42.7–76)
NRBC BLD AUTO-RTO: 0 /100 WBC (ref 0–0.2)
NT-PROBNP SERPL-MCNC: 176.8 PG/ML (ref 0–900)
PLATELET # BLD AUTO: 192 10*3/MM3 (ref 140–450)
PMV BLD AUTO: 10.6 FL (ref 6–12)
POTASSIUM SERPL-SCNC: 3.9 MMOL/L (ref 3.5–5.2)
PROT SERPL-MCNC: 6.9 G/DL (ref 6–8.5)
RBC # BLD AUTO: 4.15 10*6/MM3 (ref 4.14–5.8)
SODIUM SERPL-SCNC: 141 MMOL/L (ref 136–145)
STRESS TARGET HR: 142 BPM
TROPONIN T SERPL HS-MCNC: 13 NG/L
WBC NRBC COR # BLD: 6.42 10*3/MM3 (ref 3.4–10.8)
WHOLE BLOOD HOLD COAG: NORMAL
WHOLE BLOOD HOLD SPECIMEN: NORMAL

## 2023-04-27 PROCEDURE — 83880 ASSAY OF NATRIURETIC PEPTIDE: CPT | Performed by: EMERGENCY MEDICINE

## 2023-04-27 PROCEDURE — 80053 COMPREHEN METABOLIC PANEL: CPT | Performed by: EMERGENCY MEDICINE

## 2023-04-27 PROCEDURE — 25510000001 IOPAMIDOL PER 1 ML: Performed by: EMERGENCY MEDICINE

## 2023-04-27 PROCEDURE — 93005 ELECTROCARDIOGRAM TRACING: CPT | Performed by: EMERGENCY MEDICINE

## 2023-04-27 PROCEDURE — 93970 EXTREMITY STUDY: CPT | Performed by: INTERNAL MEDICINE

## 2023-04-27 PROCEDURE — 93970 EXTREMITY STUDY: CPT

## 2023-04-27 PROCEDURE — 99283 EMERGENCY DEPT VISIT LOW MDM: CPT

## 2023-04-27 PROCEDURE — 82565 ASSAY OF CREATININE: CPT

## 2023-04-27 PROCEDURE — 71275 CT ANGIOGRAPHY CHEST: CPT

## 2023-04-27 PROCEDURE — 85025 COMPLETE CBC W/AUTO DIFF WBC: CPT | Performed by: EMERGENCY MEDICINE

## 2023-04-27 PROCEDURE — 84484 ASSAY OF TROPONIN QUANT: CPT | Performed by: EMERGENCY MEDICINE

## 2023-04-27 RX ORDER — SODIUM CHLORIDE 0.9 % (FLUSH) 0.9 %
10 SYRINGE (ML) INJECTION AS NEEDED
Status: DISCONTINUED | OUTPATIENT
Start: 2023-04-27 | End: 2023-04-27 | Stop reason: HOSPADM

## 2023-04-27 RX ADMIN — IOPAMIDOL 89 ML: 755 INJECTION, SOLUTION INTRAVENOUS at 19:30

## 2023-04-27 NOTE — ED PROVIDER NOTES
" EMERGENCY DEPARTMENT ENCOUNTER    Pt Name: Nic Carey  MRN: [id number]  Pt :   1970  Room Number:    Date of encounter:  2023  PCP: Gab Marin MD  ED Provider: Thad Melendez MD    Historian: Patient      HPI:  Chief Complaint: Shortness of breath and positive D-dimer        Context: Nic Carey is a 52 y.o. male who presents to the ED c/o shortness of breath ongoing for the last 2 to 3 months along with right-sided posterior chest wall discomfort.  The patient was evaluated by his PCP.  A chest x-ray showed \"fluid around my heart and lungs\".  Labs yesterday showed a D-dimer from [employer] equal to 0.67.  The patient reports having undergone previous ultrasound of his lower extremities because of their swelling and notes that no DVT was found.  He has not undergone this study in several months.  He reports no significant cough and no fevers chills nausea or vomiting.  His legs were noted to be reddened and warm bilaterally by his PCP who started him on antibiotics yesterday.  They have not worsened since that time.        PAST MEDICAL HISTORY  Past Medical History:   Diagnosis Date   • Acquired hypothyroidism    • Anxiety    • Chronic low back pain    • Hypertension    • Mentally disabled    • PONV (postoperative nausea and vomiting)    • Pre-diabetes    • Stage 2 chronic kidney disease          PAST SURGICAL HISTORY  Past Surgical History:   Procedure Laterality Date   • BACK SURGERY     • CARPAL TUNNEL RELEASE Left 10/26/2022    Procedure: CARPAL TUNNEL RELEASE- LEFT;  Surgeon: Gatito Corona MD;  Location: [facility];  Service: Orthopedics;  Laterality: Left;   • LAPAROSCOPIC CHOLECYSTECTOMY     • TEETH EXTRACTION           FAMILY HISTORY  Family History   Problem Relation Age of Onset   • Hypertension Mother    • Sleep apnea Mother    • Diabetes Father    • Hypertension Father    • COPD Sister    • Stroke Brother    • Sleep apnea Brother    • " Sleep apnea Brother    • Diabetes Brother          SOCIAL HISTORY  Social History     Socioeconomic History   • Marital status:    Tobacco Use   • Smoking status: Former     Packs/day: 2.00     Types: Cigarettes     Quit date:      Years since quittin.3   • Smokeless tobacco: Never   Vaping Use   • Vaping Use: Never used   Substance and Sexual Activity   • Alcohol use: Not Currently   • Drug use: Not Currently     Types: Marijuana     Comment: pain pills quit 2 yrs ago   • Sexual activity: Defer         ALLERGIES  Ciprofloxacin        REVIEW OF SYSTEMS  Review of Systems       All systems reviewed and negative except for those discussed in HPI.       PHYSICAL EXAM    I have reviewed the triage vital signs and nursing notes.    ED Triage Vitals [23 1620]   Temp Heart Rate Resp BP SpO2   98.8 °F (37.1 °C) 59 18 150/76 98 %      Temp src Heart Rate Source Patient Position BP Location FiO2 (%)   Oral Monitor Sitting Left arm --       Physical Exam  GENERAL:   Appears in no acute distress.  Pleasant, nontoxic  HENT: Nares patent.  EYES: No scleral icterus.  CV: Regular rhythm, regular rate.  No murmurs gallops rubs  RESPIRATORY: Normal effort.  No audible wheezes, rales or rhonchi.  Clear to auscultation all fields anteriorly and posteriorly  ABDOMEN: Soft, nontender  MUSCULOSKELETAL: No deformities.   NEURO: Alert, moves all extremities, follows commands.  SKIN: Increased warmth along with erythema in the shin regions bilaterally.  Chronic lymphedema type changes of his lower extremities are noted as well.  No lymphangitic streaking.  No pitting edema.      LAB RESULTS  Recent Results (from the past 24 hour(s))   Duplex Venous Lower Extremity - Bilateral    Collection Time: 23  5:25 PM   Result Value Ref Range    Target HR (85%) 142 bpm    Max. Pred. HR (100%) 167 bpm    Right Common Femoral Spont Y     Right Common Femoral Competent Y     Right Common Femoral Phasic Y     Right Common  Femoral Compress C     Right Common Femoral Augment Y     Right Saphenofemoral Junction Compress C     Right Profunda Femoral Compress C     Right Proximal Femoral Compress C     Right Mid Femoral Spont Y     Right Mid Femoral Competent Y     Right Mid Femoral Phasic Y     Right Mid Femoral Compress C     Right Mid Femoral Augment Y     Right Distal Femoral Compress C     Right Popliteal Spont Y     Right Popliteal Competent Y     Right Popliteal Phasic Y     Right Popliteal Compress C     Right Popliteal Augment Y     Right Posterior Tibial Compress C     Right Peroneal Compress C     Right Gastronemius Compress C     Right Greater Saph AK Compress C     Right Greater Saph BK Compress C     Right Lesser Saph Compress C     Left Common Femoral Spont Y     Left Common Femoral Competent Y     Left Common Femoral Phasic Y     Left Common Femoral Compress C     Left Common Femoral Augment Y     Left Saphenofemoral Junction Compress C     Left Profunda Femoral Compress C     Left Proximal Femoral Compress C     Left Mid Femoral Spont Y     Left Mid Femoral Competent Y     Left Mid Femoral Phasic Y     Left Mid Femoral Compress C     Left Mid Femoral Augment Y     Left Distal Femoral Compress C     Left Popliteal Spont Y     Left Popliteal Competent Y     Left Popliteal Phasic Y     Left Popliteal Compress C     Left Popliteal Augment Y     Left Posterior Tibial Compress C     Left Peroneal Compress C     Left Gastronemius Compress C     Left Greater Saph AK Compress C     Left Greater Saph BK Compress C     Left Lesser Saph Compress C     BH CV VAS PRELIMINARY FINDINGS SCRIPTING 1.0    ECG 12 Lead Dyspnea    Collection Time: 04/27/23  5:53 PM   Result Value Ref Range    QT Interval 430 ms    QTC Interval 407 ms   Comprehensive Metabolic Panel    Collection Time: 04/27/23  5:56 PM    Specimen: Blood   Result Value Ref Range    Glucose 96 65 - 99 mg/dL    BUN 18 6 - 20 mg/dL    Creatinine 0.72 (L) 0.76 - 1.27 mg/dL     Sodium 141 136 - 145 mmol/L    Potassium 3.9 3.5 - 5.2 mmol/L    Chloride 102 98 - 107 mmol/L    CO2 29.0 22.0 - 29.0 mmol/L    Calcium 9.2 8.6 - 10.5 mg/dL    Total Protein 6.9 6.0 - 8.5 g/dL    Albumin 4.1 3.5 - 5.2 g/dL    ALT (SGPT) 14 1 - 41 U/L    AST (SGOT) 15 1 - 40 U/L    Alkaline Phosphatase 79 39 - 117 U/L    Total Bilirubin 0.5 0.0 - 1.2 mg/dL    Globulin 2.8 gm/dL    A/G Ratio 1.5 g/dL    BUN/Creatinine Ratio 25.0 7.0 - 25.0    Anion Gap 10.0 5.0 - 15.0 mmol/L    eGFR 109.2 >60.0 mL/min/1.73   BNP    Collection Time: 04/27/23  5:56 PM    Specimen: Blood   Result Value Ref Range    proBNP 176.8 0.0 - 900.0 pg/mL   Single High Sensitivity Troponin T    Collection Time: 04/27/23  5:56 PM    Specimen: Blood   Result Value Ref Range    HS Troponin T 13 <15 ng/L   Green Top (Gel)    Collection Time: 04/27/23  5:56 PM   Result Value Ref Range    Extra Tube Hold for add-ons.    Lavender Top    Collection Time: 04/27/23  5:56 PM   Result Value Ref Range    Extra Tube hold for add-on    Gold Top - SST    Collection Time: 04/27/23  5:56 PM   Result Value Ref Range    Extra Tube Hold for add-ons.    Light Blue Top    Collection Time: 04/27/23  5:56 PM   Result Value Ref Range    Extra Tube Hold for add-ons.    CBC Auto Differential    Collection Time: 04/27/23  5:56 PM    Specimen: Blood   Result Value Ref Range    WBC 6.42 3.40 - 10.80 10*3/mm3    RBC 4.15 4.14 - 5.80 10*6/mm3    Hemoglobin 11.9 (L) 13.0 - 17.7 g/dL    Hematocrit 37.7 37.5 - 51.0 %    MCV 90.8 79.0 - 97.0 fL    MCH 28.7 26.6 - 33.0 pg    MCHC 31.6 31.5 - 35.7 g/dL    RDW 14.4 12.3 - 15.4 %    RDW-SD 47.1 37.0 - 54.0 fl    MPV 10.6 6.0 - 12.0 fL    Platelets 192 140 - 450 10*3/mm3    Neutrophil % 64.6 42.7 - 76.0 %    Lymphocyte % 22.9 19.6 - 45.3 %    Monocyte % 8.3 5.0 - 12.0 %    Eosinophil % 3.1 0.3 - 6.2 %    Basophil % 0.8 0.0 - 1.5 %    Immature Grans % 0.3 0.0 - 0.5 %    Neutrophils, Absolute 4.15 1.70 - 7.00 10*3/mm3    Lymphocytes,  Absolute 1.47 0.70 - 3.10 10*3/mm3    Monocytes, Absolute 0.53 0.10 - 0.90 10*3/mm3    Eosinophils, Absolute 0.20 0.00 - 0.40 10*3/mm3    Basophils, Absolute 0.05 0.00 - 0.20 10*3/mm3    Immature Grans, Absolute 0.02 0.00 - 0.05 10*3/mm3    nRBC 0.0 0.0 - 0.2 /100 WBC   POC Creatinine    Collection Time: 04/27/23  6:04 PM    Specimen: Blood   Result Value Ref Range    Creatinine 0.50 (L) 0.60 - 1.30 mg/dL   POC Creatinine    Collection Time: 04/27/23  6:07 PM    Specimen: Blood   Result Value Ref Range    Creatinine 0.50 mg/dL       If labs were ordered, I independently reviewed the results and considered them in treating the patient.        RADIOLOGY  Duplex Venous Lower Extremity - Bilateral    Result Date: 4/27/2023  •  The bilateral lower extremity venous duplex scan is negative for a DVT and SVT.     CT Angiogram Chest    Result Date: 4/27/2023  CT ANGIOGRAM CHEST Date of Exam: 4/27/2023 7:23 PM EDT Indication: SOA, POS D DIMER. Comparison: None available. Technique: CTA of the chest was performed after the uneventful intravenous administration of 89 mL Isovue-370. Reconstructed coronal and sagittal images were also obtained. In addition, a 3-D volume rendered image was created for interpretation. Automated exposure control and iterative reconstruction methods were used. Findings: The central tracheobronchial tree is clear. There is a 5 mm right upper lobe nodule on image 44. There is a 4 mm right middle lobe nodule on image 58. There is bilateral interlobular septal thickening with subtle groundglass densities suggesting mild pulmonary edema. There is no pleural effusion. The heart is enlarged. The great vessels are normal in caliber. There is no evidence of pulmonary embolus. No abnormally enlarged lymph nodes are identified. Partial evaluation of the upper abdomen demonstrates change of cholecystectomy. No aggressive osseous lesions are identified.     Impression: 1.No evidence of pulmonary embolus.  2.Bilateral interlobular septal thickening with subtle groundglass densities, suggesting mild pulmonary edema. 3.Cardiomegaly. 4.Two pulmonary nodules measuring up to 5 mm. If there are no risk factors for lung cancer, no further follow-up is recommended. If there are risk factors for lung cancer, consider optional follow-up CT chest in 1 year. Electronically Signed: Arturo Cui  4/27/2023 7:44 PM EDT  Workstation ID: [id number]      I ordered and independently reviewed the above noted radiographic studies.      I viewed images of CTA chest which showed no pulmonary emboli per my independent interpretation.    See radiologist's dictation for official interpretation.        PROCEDURES    Procedures    ECG 12 Lead Dyspnea   Preliminary Result   Test Reason : DDVT   Blood Pressure :   */*   mmHG   Vent. Rate :  54 BPM     Atrial Rate :  54 BPM      P-R Int : 176 ms          QRS Dur :  86 ms       QT Int : 430 ms       P-R-T Axes :  35  18  10 degrees      QTc Int : 407 ms      Sinus bradycardia   Otherwise normal ECG   When compared with ECG of 19-OCT-2022 15:30,   No significant change was found      Referred By:            Confirmed By:           MEDICATIONS GIVEN IN ER    Medications   sodium chloride 0.9 % flush 10 mL (has no administration in time range)   iopamidol (ISOVUE-370) 76 % injection 100 mL (89 mL Intravenous Given 4/27/23 1930)         MEDICAL DECISION MAKING, PROGRESS, and CONSULTS    All labs have been independently reviewed by me.  All radiology studies have been reviewed by me and the radiologist dictating the report.  All EKG's have been independently viewed and interpreted by me.      Discussion below represents my analysis of pertinent findings related to patient's condition, differential diagnosis, treatment plan and final disposition.      Differential diagnosis:    Concern for DVT/PE.  Multiple other etiologies to include CHF, pneumonia, etc. considered as well.      Additional sources:    -  External (non-ED) record review: Office note reviewed from 1/19/2023 status post left carpal tunnel repair checkup.    - Chronic or social conditions impacting care: Obesity    - Shared decision making: Patient in full agreement with current plan for thorough evaluation and treatment.      Orders placed during this visit:  Orders Placed This Encounter   Procedures   • CT Angiogram Chest   • [place] Draw   • Comprehensive Metabolic Panel   • BNP   • Single High Sensitivity Troponin T   • CBC Auto Differential   • Cardiac Monitoring   • Continuous Pulse Oximetry   • Vital Signs   • Oxygen Therapy- Nasal Cannula; 2 LPM; Titrate for SPO2: equal to or greater than, 92%   • POC Creatinine   • POC Creatinine   • ECG 12 Lead Dyspnea   • Insert Peripheral IV   • CBC & Differential   • Green Top (Gel)   • Lavender Top   • Gold Top - SST   • Gray Top   • Light Blue Top         Additional orders considered but not ordered:  Cardiac echo    ED Course:    Consultants:      ED Course as of 04/27/23 2122   Thu Apr 27, 2023   1735 Peripheral vascular ultrasound technician the patient is negative for DVT/SVT. [MS]   2118 I spoke with the patient and his friend in detail about all findings.  He is comfortable with outpatient follow-up.  He already has antibiotics for his diagnosed cellulitis per his PCP. [MS]      ED Course User Index  [MS] Thad Melendez MD                  AS OF 21:22 EDT VITALS:    BP - 150/76  HR - 59  TEMP - 98.8 °F (37.1 °C) (Oral)  O2 SATS - 98%                  DIAGNOSIS  Final diagnoses:   Dyspnea, unspecified type   Lower extremity edema   Cellulitis of lower extremity, unspecified laterality   Elevated d-dimer   Pulmonary nodules         DISPOSITION  DISCHARGE    Patient discharged in stable condition.    Reviewed implications of results, diagnosis, meds, responsibility to follow up, warning signs and symptoms of possible worsening, potential complications and reasons to return to ER.    Patient/Family  voiced understanding of above instructions.    Discussed plan for discharge, as there is no emergent indication for admission.  Pt/family is agreeable and understands need for follow up and possible repeat testing.  Pt/family is aware that discharge does not mean that nothing is wrong but that it indicates no emergency is currently present that requires admission and they must continue care with follow-up as given below or with a physician of their choice.     FOLLOW-UP  Gab Marin MD  [address]  [facility] [address] [phone]      NEXT AVAILABLE APPOINTMENT - RECHECK OF CONDITION    [facility]ULMON[facility]  [address]  [place] [address]  [phone]    NEXT AVAILABLE APPOINTMENT - RECHECK OF CONDITION         Medication List      No changes were made to your prescriptions during this visit.             Please note that portions of this document were completed with voice recognition software.      Thad Melendez MD  04/28/23 0217

## 2023-04-28 LAB
QT INTERVAL: 430 MS
QTC INTERVAL: 407 MS

## 2023-04-28 NOTE — DISCHARGE INSTRUCTIONS
Take antibiotics as previously prescribed by your PCP.  Return if worse.    Please review the medications you are supposed to be taking according to prior physician recommendations. I have not changed your home medications during this visit. If your discharge instructions indicate that I have changed your home medications, this is not the case, and you should disregard. If you have any questions about the medication you should be taking at home, please call your physician.

## 2023-05-12 ENCOUNTER — TELEPHONE (OUTPATIENT)
Dept: SLEEP MEDICINE | Facility: HOSPITAL | Age: 53
End: 2023-05-12

## 2023-05-12 NOTE — TELEPHONE ENCOUNTER
Caller: Nic Carey    Relationship to patient: Self    Best call back number:[phone]    Patient is needing: PT IS WANTING TO BE SEEN SOONER. PT HAS BEEN ADDED TO THE WAIT LIST. PT IS WANTING TO HAVE A EARLIER APT ON SAME DAY WITH HIS DAUGHTER THEY BOTH SEE [facility]. PT ALSO STATED THAT HIS MACHINE HAS A RECALL ON IT AND HE IS NEEDING A NEW MACHINE. PLEASE ADVISE.

## 2025-08-18 ENCOUNTER — OFFICE VISIT (OUTPATIENT)
Dept: SLEEP MEDICINE | Facility: CLINIC | Age: 55
End: 2025-08-18
Payer: COMMERCIAL

## 2025-08-18 VITALS
HEIGHT: 70 IN | BODY MASS INDEX: 45.1 KG/M2 | DIASTOLIC BLOOD PRESSURE: 62 MMHG | OXYGEN SATURATION: 95 % | TEMPERATURE: 98.6 F | HEART RATE: 60 BPM | SYSTOLIC BLOOD PRESSURE: 120 MMHG | WEIGHT: 315 LBS

## 2025-08-18 DIAGNOSIS — F79 MENTALLY DISABLED: ICD-10-CM

## 2025-08-18 DIAGNOSIS — E66.01 MORBID OBESITY WITH BODY MASS INDEX (BMI) OF 50.0 TO 59.9 IN ADULT: ICD-10-CM

## 2025-08-18 DIAGNOSIS — Z99.81 OXYGEN DEPENDENT: ICD-10-CM

## 2025-08-18 DIAGNOSIS — I10 PRIMARY HYPERTENSION: Primary | ICD-10-CM

## 2025-08-18 DIAGNOSIS — G47.33 SEVERE OBSTRUCTIVE SLEEP APNEA: ICD-10-CM

## 2025-08-18 PROCEDURE — 3078F DIAST BP <80 MM HG: CPT | Performed by: NURSE PRACTITIONER

## 2025-08-18 PROCEDURE — 99213 OFFICE O/P EST LOW 20 MIN: CPT | Performed by: NURSE PRACTITIONER

## 2025-08-18 PROCEDURE — 3074F SYST BP LT 130 MM HG: CPT | Performed by: NURSE PRACTITIONER

## 2025-08-18 RX ORDER — ATORVASTATIN CALCIUM 10 MG/1
10 TABLET, FILM COATED ORAL EVERY EVENING
COMMUNITY
Start: 2025-08-02

## (undated) DEVICE — UNDRPD COMFRT GLD DRYPAD 36X57IN

## (undated) DEVICE — SHT AIR TRANSFR COMFRT GLIDE LAT 40X80IN

## (undated) DEVICE — DRAPE,REIN 53X77,STERILE: Brand: MEDLINE

## (undated) DEVICE — SUT MNCRYL PLS ANTIB UD 4/0 PC3 18IN

## (undated) DEVICE — PK EXTREM UPPR 10

## (undated) DEVICE — GOWN,REINFORCE,POLY,SIRUS,BREATH SLV,XLG: Brand: MEDLINE

## (undated) DEVICE — SPLNT PLSTR CAST 4IN

## (undated) DEVICE — DRAPE,TOP,102X53,STERILE: Brand: MEDLINE

## (undated) DEVICE — TB SXN FRAZIER 12F STRL

## (undated) DEVICE — DRSNG GZ PETROLTM XEROFORM CURAD 1X8IN STRL

## (undated) DEVICE — GLV SURG PREMIERPRO MIC LTX PF SZ8 BRN

## (undated) DEVICE — INTENDED FOR TISSUE SEPARATION, AND OTHER PROCEDURES THAT REQUIRE A SHARP SURGICAL BLADE TO PUNCTURE OR CUT.: Brand: BARD-PARKER ® STAINLESS STEEL BLADES

## (undated) DEVICE — SUT ETHLN 4/0 PS2 18IN 1667H

## (undated) DEVICE — BNDG ELAS ELITE V/CLOSE 4IN 5YD LF STRL

## (undated) DEVICE — BLD SCLPL BEAVR MINI STR 2BVL 180D LF

## (undated) DEVICE — SYR CONTRL PRESS/LO FIX/M/LL W/THMB/RNG 10ML

## (undated) DEVICE — DISPOSABLE BIPOLAR FORCEPS 4" (10.2CM) JEWELERS, STRAIGHT 0.4MM TIP AND 12 FT. (3.6M) CABLE: Brand: KIRWAN

## (undated) DEVICE — UNDERCAST PADDING: Brand: DEROYAL